# Patient Record
Sex: MALE | Race: WHITE | NOT HISPANIC OR LATINO | Employment: FULL TIME | URBAN - METROPOLITAN AREA
[De-identification: names, ages, dates, MRNs, and addresses within clinical notes are randomized per-mention and may not be internally consistent; named-entity substitution may affect disease eponyms.]

---

## 2017-02-01 ENCOUNTER — COMMUNICATION - HEALTHEAST (OUTPATIENT)
Dept: FAMILY MEDICINE | Facility: CLINIC | Age: 57
End: 2017-02-01

## 2017-02-01 DIAGNOSIS — L30.9 DERMATITIS: ICD-10-CM

## 2017-05-15 ENCOUNTER — COMMUNICATION - HEALTHEAST (OUTPATIENT)
Dept: FAMILY MEDICINE | Facility: CLINIC | Age: 57
End: 2017-05-15

## 2017-05-17 ENCOUNTER — COMMUNICATION - HEALTHEAST (OUTPATIENT)
Dept: FAMILY MEDICINE | Facility: CLINIC | Age: 57
End: 2017-05-17

## 2017-06-16 ENCOUNTER — RECORDS - HEALTHEAST (OUTPATIENT)
Dept: ADMINISTRATIVE | Facility: OTHER | Age: 57
End: 2017-06-16

## 2017-06-23 ENCOUNTER — RECORDS - HEALTHEAST (OUTPATIENT)
Dept: ADMINISTRATIVE | Facility: OTHER | Age: 57
End: 2017-06-23

## 2017-07-05 ENCOUNTER — RECORDS - HEALTHEAST (OUTPATIENT)
Dept: ADMINISTRATIVE | Facility: OTHER | Age: 57
End: 2017-07-05

## 2018-08-15 ENCOUNTER — RECORDS - HEALTHEAST (OUTPATIENT)
Dept: ADMINISTRATIVE | Facility: OTHER | Age: 58
End: 2018-08-15

## 2018-08-22 ENCOUNTER — RECORDS - HEALTHEAST (OUTPATIENT)
Dept: ADMINISTRATIVE | Facility: OTHER | Age: 58
End: 2018-08-22

## 2018-10-01 ENCOUNTER — AMBULATORY - HEALTHEAST (OUTPATIENT)
Dept: FAMILY MEDICINE | Facility: CLINIC | Age: 58
End: 2018-10-01

## 2018-10-01 ENCOUNTER — OFFICE VISIT - HEALTHEAST (OUTPATIENT)
Dept: FAMILY MEDICINE | Facility: CLINIC | Age: 58
End: 2018-10-01

## 2018-10-01 DIAGNOSIS — F41.8 DEPRESSION WITH ANXIETY: ICD-10-CM

## 2018-10-01 DIAGNOSIS — E78.5 HYPERLIPIDEMIA LDL GOAL <130: ICD-10-CM

## 2018-10-01 DIAGNOSIS — F41.1 GAD (GENERALIZED ANXIETY DISORDER): ICD-10-CM

## 2018-10-01 DIAGNOSIS — N40.1 BENIGN PROSTATIC HYPERPLASIA WITH LOWER URINARY TRACT SYMPTOMS: ICD-10-CM

## 2018-10-01 DIAGNOSIS — Z00.00 ROUTINE GENERAL MEDICAL EXAMINATION AT A HEALTH CARE FACILITY: ICD-10-CM

## 2018-10-01 DIAGNOSIS — L30.9 DERMATITIS: ICD-10-CM

## 2018-10-01 LAB
ALBUMIN SERPL-MCNC: 4 G/DL (ref 3.5–5)
ALP SERPL-CCNC: 98 U/L (ref 45–120)
ALT SERPL W P-5'-P-CCNC: 36 U/L (ref 0–45)
ANION GAP SERPL CALCULATED.3IONS-SCNC: 10 MMOL/L (ref 5–18)
AST SERPL W P-5'-P-CCNC: 38 U/L (ref 0–40)
BILIRUB SERPL-MCNC: 0.9 MG/DL (ref 0–1)
BUN SERPL-MCNC: 18 MG/DL (ref 8–22)
CALCIUM SERPL-MCNC: 9.9 MG/DL (ref 8.5–10.5)
CHLORIDE BLD-SCNC: 103 MMOL/L (ref 98–107)
CHOLEST SERPL-MCNC: 193 MG/DL
CO2 SERPL-SCNC: 29 MMOL/L (ref 22–31)
CREAT SERPL-MCNC: 1.03 MG/DL (ref 0.7–1.3)
ERYTHROCYTE [DISTWIDTH] IN BLOOD BY AUTOMATED COUNT: 12.7 % (ref 11–14.5)
FASTING STATUS PATIENT QL REPORTED: YES
GFR SERPL CREATININE-BSD FRML MDRD: >60 ML/MIN/1.73M2
GLUCOSE BLD-MCNC: 75 MG/DL (ref 70–125)
HCT VFR BLD AUTO: 42 % (ref 40–54)
HDLC SERPL-MCNC: 91 MG/DL
HGB BLD-MCNC: 14.6 G/DL (ref 14–18)
LDLC SERPL CALC-MCNC: 95 MG/DL
MCH RBC QN AUTO: 30.4 PG (ref 27–34)
MCHC RBC AUTO-ENTMCNC: 34.8 G/DL (ref 32–36)
MCV RBC AUTO: 87 FL (ref 80–100)
PLATELET # BLD AUTO: 185 THOU/UL (ref 140–440)
PMV BLD AUTO: 7.8 FL (ref 7–10)
POTASSIUM BLD-SCNC: 4 MMOL/L (ref 3.5–5)
PROT SERPL-MCNC: 6.6 G/DL (ref 6–8)
RBC # BLD AUTO: 4.8 MILL/UL (ref 4.4–6.2)
SODIUM SERPL-SCNC: 142 MMOL/L (ref 136–145)
TRIGL SERPL-MCNC: 37 MG/DL
WBC: 8.6 THOU/UL (ref 4–11)

## 2018-10-01 ASSESSMENT — MIFFLIN-ST. JEOR: SCORE: 1512.13

## 2019-09-16 ENCOUNTER — OFFICE VISIT - HEALTHEAST (OUTPATIENT)
Dept: FAMILY MEDICINE | Facility: CLINIC | Age: 59
End: 2019-09-16

## 2019-09-16 DIAGNOSIS — J02.9 SORE THROAT: ICD-10-CM

## 2019-09-16 DIAGNOSIS — L30.9 DERMATITIS: ICD-10-CM

## 2019-09-16 DIAGNOSIS — S01.81XD LACERATION OF BROW WITHOUT COMPLICATION, SUBSEQUENT ENCOUNTER: ICD-10-CM

## 2019-09-16 DIAGNOSIS — Z00.00 ROUTINE GENERAL MEDICAL EXAMINATION AT A HEALTH CARE FACILITY: ICD-10-CM

## 2019-09-16 DIAGNOSIS — N40.1 BENIGN PROSTATIC HYPERPLASIA WITH LOWER URINARY TRACT SYMPTOMS, SYMPTOM DETAILS UNSPECIFIED: ICD-10-CM

## 2019-09-16 LAB
CHOLEST SERPL-MCNC: 160 MG/DL
DEPRECATED S PYO AG THROAT QL EIA: NORMAL
FASTING STATUS PATIENT QL REPORTED: YES
FASTING STATUS PATIENT QL REPORTED: YES
GLUCOSE BLD-MCNC: 80 MG/DL (ref 70–125)
HDLC SERPL-MCNC: 77 MG/DL
LDLC SERPL CALC-MCNC: 73 MG/DL
PSA SERPL-MCNC: 1.1 NG/ML (ref 0–3.5)
TRIGL SERPL-MCNC: 50 MG/DL

## 2019-09-16 ASSESSMENT — MIFFLIN-ST. JEOR: SCORE: 1499.96

## 2019-09-16 ASSESSMENT — PATIENT HEALTH QUESTIONNAIRE - PHQ9: SUM OF ALL RESPONSES TO PHQ QUESTIONS 1-9: 11

## 2019-09-17 LAB — GROUP A STREP BY PCR: NORMAL

## 2019-11-11 LAB — PHQ9 SCORE: 12

## 2019-11-27 ENCOUNTER — OFFICE VISIT - HEALTHEAST (OUTPATIENT)
Dept: FAMILY MEDICINE | Facility: CLINIC | Age: 59
End: 2019-11-27

## 2019-11-27 DIAGNOSIS — J40 BRONCHITIS: ICD-10-CM

## 2020-01-22 ENCOUNTER — COMMUNICATION - HEALTHEAST (OUTPATIENT)
Dept: FAMILY MEDICINE | Facility: CLINIC | Age: 60
End: 2020-01-22

## 2020-01-22 DIAGNOSIS — L30.9 DERMATITIS: ICD-10-CM

## 2020-01-28 ENCOUNTER — OFFICE VISIT - HEALTHEAST (OUTPATIENT)
Dept: FAMILY MEDICINE | Facility: CLINIC | Age: 60
End: 2020-01-28

## 2020-01-28 DIAGNOSIS — J18.9 PNEUMONIA DUE TO INFECTIOUS ORGANISM, UNSPECIFIED LATERALITY, UNSPECIFIED PART OF LUNG: ICD-10-CM

## 2020-01-28 ASSESSMENT — MIFFLIN-ST. JEOR: SCORE: 1510.94

## 2020-02-05 ENCOUNTER — HOSPITAL ENCOUNTER (OUTPATIENT)
Dept: CT IMAGING | Facility: CLINIC | Age: 60
Discharge: HOME | End: 2020-02-05

## 2020-02-05 DIAGNOSIS — J40 BRONCHITIS: ICD-10-CM

## 2020-02-17 ENCOUNTER — RECORDS - HEALTHEAST (OUTPATIENT)
Dept: ADMINISTRATIVE | Facility: OTHER | Age: 60
End: 2020-02-17

## 2020-02-17 LAB — PHQ9 SCORE: 11

## 2020-05-14 ENCOUNTER — RECORDS - HEALTHEAST (OUTPATIENT)
Dept: ADMINISTRATIVE | Facility: OTHER | Age: 60
End: 2020-05-14

## 2020-08-18 ENCOUNTER — RECORDS - HEALTHEAST (OUTPATIENT)
Dept: ADMINISTRATIVE | Facility: OTHER | Age: 60
End: 2020-08-18

## 2020-09-13 ENCOUNTER — COMMUNICATION - HEALTHEAST (OUTPATIENT)
Dept: FAMILY MEDICINE | Facility: CLINIC | Age: 60
End: 2020-09-13

## 2020-09-13 DIAGNOSIS — N40.1 BENIGN PROSTATIC HYPERPLASIA WITH LOWER URINARY TRACT SYMPTOMS, SYMPTOM DETAILS UNSPECIFIED: ICD-10-CM

## 2020-10-23 ENCOUNTER — OFFICE VISIT - HEALTHEAST (OUTPATIENT)
Dept: FAMILY MEDICINE | Facility: CLINIC | Age: 60
End: 2020-10-23

## 2020-10-23 DIAGNOSIS — N40.1 BENIGN PROSTATIC HYPERPLASIA WITH LOWER URINARY TRACT SYMPTOMS, SYMPTOM DETAILS UNSPECIFIED: ICD-10-CM

## 2020-10-23 DIAGNOSIS — Z00.00 ROUTINE GENERAL MEDICAL EXAMINATION AT A HEALTH CARE FACILITY: ICD-10-CM

## 2020-10-23 DIAGNOSIS — F41.8 DEPRESSION WITH ANXIETY: ICD-10-CM

## 2020-10-23 LAB
ALBUMIN SERPL-MCNC: 4.2 G/DL (ref 3.5–5)
ALP SERPL-CCNC: 102 U/L (ref 45–120)
ALT SERPL W P-5'-P-CCNC: 26 U/L (ref 0–45)
ANION GAP SERPL CALCULATED.3IONS-SCNC: 9 MMOL/L (ref 5–18)
AST SERPL W P-5'-P-CCNC: 30 U/L (ref 0–40)
BILIRUB SERPL-MCNC: 0.6 MG/DL (ref 0–1)
BUN SERPL-MCNC: 16 MG/DL (ref 8–22)
CALCIUM SERPL-MCNC: 10.6 MG/DL (ref 8.5–10.5)
CHLORIDE BLD-SCNC: 102 MMOL/L (ref 98–107)
CHOLEST SERPL-MCNC: 199 MG/DL
CO2 SERPL-SCNC: 30 MMOL/L (ref 22–31)
CREAT SERPL-MCNC: 1.15 MG/DL (ref 0.7–1.3)
ERYTHROCYTE [DISTWIDTH] IN BLOOD BY AUTOMATED COUNT: 12.6 % (ref 11–14.5)
FASTING STATUS PATIENT QL REPORTED: YES
GFR SERPL CREATININE-BSD FRML MDRD: >60 ML/MIN/1.73M2
GLUCOSE BLD-MCNC: 91 MG/DL (ref 70–125)
HCT VFR BLD AUTO: 45 % (ref 40–54)
HDLC SERPL-MCNC: 86 MG/DL
HGB BLD-MCNC: 15 G/DL (ref 14–18)
LDLC SERPL CALC-MCNC: 105 MG/DL
MCH RBC QN AUTO: 29.8 PG (ref 27–34)
MCHC RBC AUTO-ENTMCNC: 33.3 G/DL (ref 32–36)
MCV RBC AUTO: 89 FL (ref 80–100)
PLATELET # BLD AUTO: 227 THOU/UL (ref 140–440)
PMV BLD AUTO: 8.7 FL (ref 7–10)
POTASSIUM BLD-SCNC: 5.2 MMOL/L (ref 3.5–5)
PROT SERPL-MCNC: 6.7 G/DL (ref 6–8)
PSA SERPL-MCNC: 1 NG/ML (ref 0–3.5)
RBC # BLD AUTO: 5.03 MILL/UL (ref 4.4–6.2)
SODIUM SERPL-SCNC: 141 MMOL/L (ref 136–145)
TRIGL SERPL-MCNC: 40 MG/DL
WBC: 6.1 THOU/UL (ref 4–11)

## 2020-10-23 ASSESSMENT — MIFFLIN-ST. JEOR: SCORE: 1483.5

## 2020-11-16 ENCOUNTER — RECORDS - HEALTHEAST (OUTPATIENT)
Dept: ADMINISTRATIVE | Facility: OTHER | Age: 60
End: 2020-11-16

## 2021-02-15 ENCOUNTER — RECORDS - HEALTHEAST (OUTPATIENT)
Dept: ADMINISTRATIVE | Facility: OTHER | Age: 61
End: 2021-02-15

## 2021-02-22 ENCOUNTER — COMMUNICATION - HEALTHEAST (OUTPATIENT)
Dept: FAMILY MEDICINE | Facility: CLINIC | Age: 61
End: 2021-02-22

## 2021-02-22 DIAGNOSIS — L30.9 DERMATITIS: ICD-10-CM

## 2021-03-14 ENCOUNTER — COMMUNICATION - HEALTHEAST (OUTPATIENT)
Dept: FAMILY MEDICINE | Facility: CLINIC | Age: 61
End: 2021-03-14

## 2021-03-14 DIAGNOSIS — N40.1 BENIGN PROSTATIC HYPERPLASIA WITH LOWER URINARY TRACT SYMPTOMS, SYMPTOM DETAILS UNSPECIFIED: ICD-10-CM

## 2021-03-26 ENCOUNTER — OFFICE VISIT - HEALTHEAST (OUTPATIENT)
Dept: FAMILY MEDICINE | Facility: CLINIC | Age: 61
End: 2021-03-26

## 2021-03-26 ENCOUNTER — RECORDS - HEALTHEAST (OUTPATIENT)
Dept: GENERAL RADIOLOGY | Facility: CLINIC | Age: 61
End: 2021-03-26

## 2021-03-26 DIAGNOSIS — M25.552 PAIN IN LEFT HIP: ICD-10-CM

## 2021-03-26 DIAGNOSIS — E87.5 HYPERKALEMIA: ICD-10-CM

## 2021-03-26 DIAGNOSIS — M25.552 HIP PAIN, LEFT: ICD-10-CM

## 2021-03-26 LAB
ANION GAP SERPL CALCULATED.3IONS-SCNC: 9 MMOL/L (ref 5–18)
BUN SERPL-MCNC: 16 MG/DL (ref 8–22)
CALCIUM SERPL-MCNC: 9.4 MG/DL (ref 8.5–10.5)
CHLORIDE BLD-SCNC: 101 MMOL/L (ref 98–107)
CO2 SERPL-SCNC: 28 MMOL/L (ref 22–31)
CREAT SERPL-MCNC: 1.04 MG/DL (ref 0.7–1.3)
GFR SERPL CREATININE-BSD FRML MDRD: >60 ML/MIN/1.73M2
GLUCOSE BLD-MCNC: 87 MG/DL (ref 70–125)
POTASSIUM BLD-SCNC: 4.7 MMOL/L (ref 3.5–5)
SODIUM SERPL-SCNC: 138 MMOL/L (ref 136–145)

## 2021-03-30 ENCOUNTER — RECORDS - HEALTHEAST (OUTPATIENT)
Dept: ADMINISTRATIVE | Facility: OTHER | Age: 61
End: 2021-03-30

## 2021-04-14 ENCOUNTER — RECORDS - HEALTHEAST (OUTPATIENT)
Dept: ADMINISTRATIVE | Facility: OTHER | Age: 61
End: 2021-04-14

## 2021-05-11 ENCOUNTER — RECORDS - HEALTHEAST (OUTPATIENT)
Dept: ADMINISTRATIVE | Facility: OTHER | Age: 61
End: 2021-05-11

## 2021-05-26 ASSESSMENT — PATIENT HEALTH QUESTIONNAIRE - PHQ9: SUM OF ALL RESPONSES TO PHQ QUESTIONS 1-9: 11

## 2021-05-30 ENCOUNTER — RECORDS - HEALTHEAST (OUTPATIENT)
Dept: ADMINISTRATIVE | Facility: CLINIC | Age: 61
End: 2021-05-30

## 2021-06-01 ENCOUNTER — RECORDS - HEALTHEAST (OUTPATIENT)
Dept: ADMINISTRATIVE | Facility: OTHER | Age: 61
End: 2021-06-01

## 2021-06-01 ENCOUNTER — RECORDS - HEALTHEAST (OUTPATIENT)
Dept: ADMINISTRATIVE | Facility: CLINIC | Age: 61
End: 2021-06-01

## 2021-06-01 VITALS — BODY MASS INDEX: 21.62 KG/M2 | HEIGHT: 70 IN | WEIGHT: 151 LBS
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
## 2021-06-03 VITALS
SYSTOLIC BLOOD PRESSURE: 130 MMHG | BODY MASS INDEX: 21.43 KG/M2 | HEART RATE: 83 BPM | WEIGHT: 151 LBS | OXYGEN SATURATION: 99 % | TEMPERATURE: 98.4 F | DIASTOLIC BLOOD PRESSURE: 80 MMHG

## 2021-06-03 VITALS
SYSTOLIC BLOOD PRESSURE: 128 MMHG | OXYGEN SATURATION: 98 % | DIASTOLIC BLOOD PRESSURE: 72 MMHG | HEART RATE: 61 BPM | WEIGHT: 148.3 LBS | HEIGHT: 70 IN | BODY MASS INDEX: 21.23 KG/M2

## 2021-06-03 NOTE — PROGRESS NOTES
Assessment/Plan:    1. Bronchitis  Chest x-ray obtained today.  Radiology confirmed a left mid chest faint to 10 mm nodular opacity.  Recommend follow-up with chest CT.  Placed today.  In the meantime, will treat with 10-day course of doxycycline 100 mg twice daily.  Will await results of chest CT and determine most appropriate plan going forward.  We discussed concerning symptoms that would warrant immediate evaluation.  - XR Chest 2 Views  - CT Chest With Contrast; Future  - doxycycline (MONODOX) 100 MG capsule; Take 1 capsule (100 mg total) by mouth 2 (two) times a day for 10 days.  Dispense: 20 capsule; Refill: 0      Subjective:    Vinh Cuellar is a 59 year old seen today for evaluation of persistent cough.  Patient noticed cough a little over 2 weeks ago.  Started out as a typical chest cold with cough, chest congestion, had fevers, chills and body aches as well.  Had occasional headaches.  Symptoms seem to resolve for the most part except for the cough.  He feels that cough is persisting and changing.  It has caused a hoarseness in his voice.  He has a hard time getting a productive cough and it feels weaker now.  He feels that the cough is keeping him up at night.  He denies any continued fevers, chills or body aches.  No nasal drainage, ear pain or sinus tenderness at this point.  The cough is productive at times.  He does not have any notable shortness of breath or wheezing.  No pain in his chest at this point.  He denies any ill contacts.  No recent travel outside the country.  Has not been taking any medications for his cough.  She denies any smoking or vaping history. Review of systems is as stated in HPI, and the remainder of the 10 system review is otherwise unremarkable.    Past Medical History, Family History, and Social History reviewed.    No past surgical history on file.     No family history on file.     No past medical history on file.     Social History     Tobacco Use     Smoking status:  Never Smoker     Smokeless tobacco: Never Used   Substance Use Topics     Alcohol use: No     Drug use: No        Current Outpatient Medications   Medication Sig Dispense Refill     buPROPion (WELLBUTRIN SR) 150 MG 12 hr tablet Take 150 mg by mouth 2 (two) times a day.       buPROPion (WELLBUTRIN XL) 300 MG 24 hr tablet   2     fluvoxaMINE (LUVOX) 100 MG tablet   2     lamoTRIgine (LAMICTAL) 100 MG tablet Take 100 mg by mouth every evening.        lamoTRIgine (LAMICTAL) 25 MG tablet Take 50 mg by mouth every morning.        multivitamin with minerals (MEN'S ONE DAILY) tablet Take 1 tablet by mouth daily.       tamsulosin (FLOMAX) 0.4 mg cap Take 1 capsule (0.4 mg total) by mouth daily after supper. 90 capsule 3     triamcinolone (KENALOG) 0.1 % cream Apply topically twice a day (generic for kenalog) 15 g 3     doxycycline (MONODOX) 100 MG capsule Take 1 capsule (100 mg total) by mouth 2 (two) times a day for 10 days. 20 capsule 0     No current facility-administered medications for this visit.           Objective:    Vitals:    11/27/19 1522   BP: 130/80   Patient Site: Right Arm   Patient Position: Sitting   Cuff Size: Adult Regular   Pulse: 83   Temp: 98.4  F (36.9  C)   TempSrc: Oral   SpO2: 99%   Weight: 151 lb (68.5 kg)      Body mass index is 21.43 kg/m .      General Appearance:  Alert, cooperative, no distress, appears stated age   HEENT:   Cough.  Moist mucous membranes, posterior oropharynx clear.  No nasal drainage or sinus tenderness.  Tympanic membrane's and ear canals appear normal bilaterally.   Neck: Supple, symmetrical, no adenopathy.   Lungs:    Chest wall:   Clear to auscultation bilaterally, respirations unlabored.  No expiratory wheeze or inspiratory crackles noted.  No chest wall pain   Heart:  Regular rate and rhythm, S1, S2 normal, no murmur, rub or gallop   Extremities: Extremities normal.  No cyanosis or edema   Skin: Warm, dry.  Skin color, texture, turgor normal, no rashes or lesions        Chest xray:  Hyperinflation consistent with obstructive pulmonary disease. Stable right basilar scarring. Heart size appears normal. The peripheral pulmonary vessels are slightly prominent. This could represent early interstitial edema. No effusion.     Left mid chest has a faint 10 mm nodular opacity on the 2 AP views. This could represent an anterior rib end, or lung nodule. It was not visible previously.      CT chest recommended.     NOTE: ABNORMAL REPORT    This note has been dictated using voice recognition software. Any grammatical or context distortions are unintentional and inherent to the use of this software.

## 2021-06-04 VITALS
WEIGHT: 151 LBS | HEART RATE: 58 BPM | DIASTOLIC BLOOD PRESSURE: 72 MMHG | BODY MASS INDEX: 21.62 KG/M2 | SYSTOLIC BLOOD PRESSURE: 138 MMHG | HEIGHT: 70 IN | OXYGEN SATURATION: 99 % | TEMPERATURE: 98.3 F

## 2021-06-04 VITALS
OXYGEN SATURATION: 99 % | WEIGHT: 146 LBS | SYSTOLIC BLOOD PRESSURE: 124 MMHG | DIASTOLIC BLOOD PRESSURE: 72 MMHG | BODY MASS INDEX: 20.9 KG/M2 | HEIGHT: 70 IN | HEART RATE: 71 BPM

## 2021-06-05 VITALS
BODY MASS INDEX: 21.35 KG/M2 | HEART RATE: 68 BPM | DIASTOLIC BLOOD PRESSURE: 64 MMHG | WEIGHT: 148.8 LBS | OXYGEN SATURATION: 100 % | SYSTOLIC BLOOD PRESSURE: 120 MMHG

## 2021-06-05 NOTE — PROGRESS NOTES
" Patient ID: Vinh Cuellar is a 59 y.o. male.  /72   Pulse (!) 58   Temp 98.3  F (36.8  C)   Ht 5' 10.3\" (1.786 m)   Wt 151 lb (68.5 kg)   SpO2 99%   BMI 21.48 kg/m      Assessment/Plan:                   Diagnoses and all orders for this visit:    Pneumonia due to infectious organism, unspecified laterality, unspecified part of lung  -     amoxicillin (AMOXIL) 875 MG tablet; Take 1 tablet (875 mg total) by mouth 2 (two) times a day for 10 days.  Dispense: 20 tablet; Refill: 0  -     azithromycin (ZITHROMAX Z-KEMI) 250 MG tablet; Take 2 tablets (500 mg) on  Day 1,  followed by 1 tablet (250 mg) once daily on Days 2 through 5.  Dispense: 6 tablet; Refill: 0        DISCUSSION  Clinically symptoms are consistent with pneumonia.  Since he is scheduled for a CT scan next week we will proceed with this rather than the imaging today because clinically speaking his symptoms are most consistent with a pneumonia process.  We will start him on typical community-acquired pneumonia pathway with amoxicillin azithromycin.  Recommend continued symptomatic treatment as desired.  Follow-up if there is worsening.  Subjective:     HPI    Vinh Cuellar is a 59 y.o. male he is here today concerned regarding cough.  Patient reports that he has had a week of symptoms that have included productive mucus that is greenish in color frequent cough with occasional coughing spells.  He feels tired and rundown.  Denies fevers or chills or significant shortness of breath.  No focal chest pain.  Patient noted to have ongoing concerns with the more mild respiratory infection symptoms stemming back to November.  He was seen at that time he had a chest x-ray that showed what was possibly a 10 mm nodule.  It was recommended he have a CT scan of the chest which was scheduled for next week.  The finding actually occurred back in November.  I discussed the importance of follow-through on this to make sure this is not a more significant " "process.    Review of Systems   Complete review of systems is obtained.  Other than the specific considerations noted above complete review of systems is negative.        Objective:   Medications:  Current Outpatient Medications   Medication Sig Note     amoxicillin (AMOXIL) 875 MG tablet Take 1 tablet (875 mg total) by mouth 2 (two) times a day for 10 days.      azithromycin (ZITHROMAX Z-KEMI) 250 MG tablet Take 2 tablets (500 mg) on  Day 1,  followed by 1 tablet (250 mg) once daily on Days 2 through 5.      buPROPion (WELLBUTRIN SR) 150 MG 12 hr tablet Take 150 mg by mouth 2 (two) times a day.      buPROPion (WELLBUTRIN XL) 300 MG 24 hr tablet  10/1/2018: Received from: External Pharmacy     fluvoxaMINE (LUVOX) 100 MG tablet  10/1/2018: Received from: External Pharmacy     lamoTRIgine (LAMICTAL) 100 MG tablet Take 100 mg by mouth every evening.       lamoTRIgine (LAMICTAL) 25 MG tablet Take 50 mg by mouth every morning.       multivitamin with minerals (MEN'S ONE DAILY) tablet Take 1 tablet by mouth daily.      tamsulosin (FLOMAX) 0.4 mg cap Take 1 capsule (0.4 mg total) by mouth daily after supper.      triamcinolone (KENALOG) 0.1 % cream Apply topically twice a day (generic for kenalog)      Allergies:  Allergies   Allergen Reactions     Detrol [Tolterodine] Hives     Tobacco:   reports that he has never smoked. He has never used smokeless tobacco.     Physical Exam      /72   Pulse (!) 58   Temp 98.3  F (36.8  C)   Ht 5' 10.3\" (1.786 m)   Wt 151 lb (68.5 kg)   SpO2 99%   BMI 21.48 kg/m      General Appearance:    Alert, cooperative, no distress   Eyes:   No scleral icterus or conjunctival irritation       Ears:    Normal TM's and external ear canals, both ears   Throat:   Lips, mucosa, and tongue normal; teeth and gums normal   Neck:   Supple, symmetrical, trachea midline, no adenopathy;        thyroid:  No enlargement/tenderness/nodules   Lungs:     Clear to auscultation bilaterally, respirations " unlabored, no wheezes or crackles   Heart:    Regular rate and rhythm,  No murmur   Extremities:  No edema, no joint swelling or redness, no evidence of any injuries   Skin:  No concerning skin findings, no suspicious moles, no rashes   Neurologic:  On gross examination there is no motor or sensory deficit.  Patient walks with a normal gait

## 2021-06-05 NOTE — TELEPHONE ENCOUNTER
RN cannot approve Refill Request    RN can NOT refill this medication med is not covered by policy/route to provider     . Last office visit: 5/6/2016 Bruce Pierda MD Last Physical: 9/16/2019 Last MTM visit: Visit date not found Last visit same specialty: 11/27/2019 Makayla Hester, [facility].  Next visit within 3 mo: Visit date not found  Next physical within 3 mo: Visit date not found      Gabrielle Reno, [facility] Triage/Med Refill 1/22/2020    Requested Prescriptions   Pending Prescriptions Disp Refills     triamcinolone (KENALOG) 0.1 % cream 15 g 3     Sig: Apply topically twice a day (generic for kenalog)       There is no refill protocol information for this order

## 2021-06-11 NOTE — TELEPHONE ENCOUNTER
Refill Approved    Rx renewed per Medication Renewal Policy. Medication was last renewed on 9/16/19.    Gabrielle Reno, [facility] Triage/Med Refill 9/14/2020     Requested Prescriptions   Pending Prescriptions Disp Refills     tamsulosin (FLOMAX) 0.4 mg cap [Pharmacy Med Name: TAMSULOSIN HCL 0.4MG CAPS] 90 capsule 3     Sig: TAKE ONE CAPSULE BY MOUTH EVERY DAY AFTER SUPPER       Alfuzosin/Tamsulosin/Silodosin Refill Protocol  Passed - 9/13/2020  8:19 PM        Passed - PCP or prescribing provider visit in past 12 months       Last office visit with prescriber/PCP: 1/28/2020 Bruce Piedra MD OR same dept: 1/28/2020 Bruce Piedra MD OR same specialty: 1/28/2020 Bruce Piedra MD  Last physical: 9/16/2019 Last MTM visit: Visit date not found   Next visit within 3 mo: Visit date not found  Next physical within 3 mo: Visit date not found  Prescriber OR PCP: Bruce Piedra MD  Last diagnosis associated with med order: 1. Benign prostatic hyperplasia with lower urinary tract symptoms, symptom details unspecified  - tamsulosin (FLOMAX) 0.4 mg cap [Pharmacy Med Name: TAMSULOSIN HCL 0.4MG CAPS]; TAKE ONE CAPSULE BY MOUTH EVERY DAY AFTER SUPPER  Dispense: 90 capsule; Refill: 3    If protocol passes may refill for 12 months if within 3 months of last provider visit (or a total of 15 months).
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
## 2021-06-15 NOTE — TELEPHONE ENCOUNTER
Refill Approved    Rx renewed per Medication Renewal Policy. Medication was last renewed on 9/14/20.    Neil Franco, [facility] Triage/Med Refill 3/15/2021     Requested Prescriptions   Pending Prescriptions Disp Refills     tamsulosin (FLOMAX) 0.4 mg cap [Pharmacy Med Name: TAMSULOSIN HCL 0.4MG CAPS] 90 capsule 1     Sig: TAKE ONE CAPSULE BY MOUTH EVERY DAY AFTER SUPPER       Alfuzosin/Tamsulosin/Silodosin Refill Protocol  Passed - 3/14/2021  8:10 PM        Passed - PCP or prescribing provider visit in past 12 months       Last office visit with prescriber/PCP: 1/28/2020 Bruce Piedra MD OR same dept: Visit date not found OR same specialty: 1/28/2020 Bruce Piedra MD  Last physical: 10/23/2020 Last MTM visit: Visit date not found   Next visit within 3 mo: Visit date not found  Next physical within 3 mo: Visit date not found  Prescriber OR PCP: Bruce Piedra MD  Last diagnosis associated with med order: 1. Benign prostatic hyperplasia with lower urinary tract symptoms, symptom details unspecified  - tamsulosin (FLOMAX) 0.4 mg cap [Pharmacy Med Name: TAMSULOSIN HCL 0.4MG CAPS]; TAKE ONE CAPSULE BY MOUTH EVERY DAY AFTER SUPPER  Dispense: 90 capsule; Refill: 1    If protocol passes may refill for 12 months if within 3 months of last provider visit (or a total of 15 months).

## 2021-06-16 NOTE — PROGRESS NOTES
Patient ID: Vinh Cuellar is a 60 y.o. male.  /64   Pulse 68   Wt 148 lb 12.8 oz (67.5 kg)   SpO2 100%   BMI 21.35 kg/m      Assessment/Plan:                   Diagnoses and all orders for this visit:    Hyperkalemia  -     Basic Metabolic Panel    Hip pain, left  -     XR Hip Left 2 or More VWS; Future; Expected date: 03/26/2021  -     Ambulatory referral to Orthopedics    Other orders  -     Cancel: Comprehensive Metabolic Panel           DISCUSSION  Obtain x-ray of the hip.  Highest likelihood based on clinical scenario of osteoarthritis.  Obtain basic metabolic profile.    Hip x-ray shows progressive degenerative changes within the hip joint which I feel is likely the cause of his pain symptoms.  Discussed continued use of over-the-counter analgesic medication to manage pain along with carefully choosing activities.  We will get him set up to see orthopedic specialty provider for further evaluation and treatment planning.  Subjective:     HPI    Vinh Cuellar is a 60 y.o. male he is here today to discuss ongoing left hip pain that is now worse than when he was in for his physical in January.  He also needs repeat laboratory test.  On routine laboratory testing he had mild hyperkalemia and hypocalcemia.    He is active person has been walking and biking, normally he is a runner.  Due to increasing left hip pain has not been able to run.  Reports more aching pain that is more common and more severe.  Pain radiates sometimes all the way down to the foot.  Originates in the left hip/groin area.  No history of a specific injury.  More conservative treatment has not helped to alleviate symptoms overall.  Acetaminophen and ibuprofen along with topical pain relievers including Voltaren gel and Aspercreme have been tried with some success in alleviating more severe discomfort.  Denies numbness or tingling.  No other areas of significant joint pain.    Electrolyte disturbances likely transient.  No  medications that would directly affect this.  Will be checked before taking any further action.    Review of Systems  Complete review of systems is obtained.  Other than the specific considerations noted above complete review of systems is negative.          Objective:   Medications:  Current Outpatient Medications   Medication Sig Note     buPROPion (WELLBUTRIN XL) 300 MG 24 hr tablet  10/1/2018: Received from: External Pharmacy     cholecalciferol, vitamin D3, (VITAMIN D3) 25 mcg (1,000 unit) capsule Take 1,000 Units by mouth daily.      fluvoxaMINE (LUVOX) 100 MG tablet Take 150 mg by mouth 2 (two) times a day.  10/1/2018: Received from: External Pharmacy     lamoTRIgine (LAMICTAL) 100 MG tablet Take 100 mg by mouth every evening.       lamoTRIgine (LAMICTAL) 25 MG tablet Take 50 mg by mouth every morning.       multivitamin with minerals (MEN'S ONE DAILY) tablet Take 1 tablet by mouth daily.      tamsulosin (FLOMAX) 0.4 mg cap TAKE ONE CAPSULE BY MOUTH EVERY DAY AFTER SUPPER      triamcinolone (KENALOG) 0.1 % cream Apply topically twice a day (generic for kenalog)        Allergies:  Allergies   Allergen Reactions     Detrol [Tolterodine] Hives       Tobacco:   reports that he has never smoked. He has never used smokeless tobacco.     Physical Exam          /64   Pulse 68   Wt 148 lb 12.8 oz (67.5 kg)   SpO2 100%   BMI 21.35 kg/m      General: Patient no signs of distress    Patient was hip reveals no pain on palpation.  There is no pain over the trochanter.  Extreme limitation with significant pain with both internal and external rotation.  Very limited external rotation secondary to discomfort.  Hip flexion is not significantly limited, there is good hip flexor strength.  No evidence of any strength deficit in the lower extremity.  Sensation grossly intact.

## 2021-06-20 NOTE — PROGRESS NOTES
" Patient ID: Vinh Cuellar is a 58 y.o. male.  /74  Pulse (!) 57  Ht 5' 10.38\" (1.788 m)  Wt 151 lb (68.5 kg)  SpO2 99%  BMI 21.44 kg/m2    Assessment/Plan:                   Diagnoses and all orders for this visit:    Routine general medical examination at a health care facility    Benign prostatic hyperplasia with lower urinary tract symptoms    Depression with anxiety  -     Comprehensive Metabolic Panel  -     HM2(CBC w/o Differential)    STEVEN (generalized anxiety disorder)    Dermatitis  -     triamcinolone (KENALOG) 0.1 % cream; Apply topically twice a day (generic for kenalog)  Dispense: 15 g; Refill: 3    Hyperlipidemia LDL goal <130  -     Lipid Cascade    Other orders  -     Cancel: PSA, Annual Screen (Prostatic-Specific Antigen)  -     Influenza, Recombinant, Inj, Quadrivalent, PF, 18+YRS      DISCUSSION  Continue current medications for management of mental health concerns recommend he continue to follow-up with his mental health care providers as prior.  We will obtain some additional labs including a conference metabolic panel and hemogram for medication monitoring purposes.  We will check cholesterol and blood sugar as well.  Influenza vaccine today.  Refill triamcinolone for management of chronic dermatitis.  Overall doing well recommend he follow-up with urology as they have suggested.  Colonoscopy will not be due until 2020.  Subjective:     HPI    Vinh Cuellar is a 58-year man who is here today for a physical.  He has anxiety and depression symptoms that are managed by psychiatry.  Patient reports his mental health is doing very well.  He is overactive bladder, history of prostatic hypertrophy and a prostate nodule.  He sees urology on a regular basis including just a few weeks ago for an evaluation.  Patient reports no concerns and those consultation notes are reviewed.  Patient reports no new skin concerns.  Has issues with chronic dermatitis on the face and trunk region for " which she uses low potency topical corticosteroid.  He had a suspicious skin lesion removed a couple of years ago and reports no new concerns.  He is up-to-date with colonoscopy screening.  Reviewed immunizations.  Discussed social and family history.  Discussed more specifically his father who had history of vascular disease and  this past year of chronic renal disease.    Review of Systems  Complete review of systems is obtained.  Other than the specific considerations noted above complete review of systems is negative.      Objective:   Medications:  Current Outpatient Prescriptions   Medication Sig Note     buPROPion (WELLBUTRIN XL) 300 MG 24 hr tablet  10/1/2018: Received from: External Pharmacy     fluvoxaMINE (LUVOX) 100 MG tablet  10/1/2018: Received from: External Pharmacy     lamoTRIgine (LAMICTAL) 100 MG tablet Take 100 mg by mouth every evening.       lamoTRIgine (LAMICTAL) 25 MG tablet Take 50 mg by mouth every morning.       multivitamin with minerals (MEN'S ONE DAILY) tablet Take 1 tablet by mouth daily.      tamsulosin (FLOMAX) 0.4 mg Cp24 Take 1 capsule (0.4 mg total) by mouth daily after supper.      triamcinolone (KENALOG) 0.1 % cream Apply topically twice a day (generic for kenalog)      Allergies:  Allergies   Allergen Reactions     Detrol [Tolterodine] Hives     Tobacco:   reports that he has never smoked. He has never used smokeless tobacco.    HEALTH PREVENTION    General  Dental care: Discussed the importance of regular dental care.  Eye care: Discussed importance of routine eye exams for glaucoma screening  Exercise: He runs on average 40 miles per week.  He has an excellent exercise regiment.  Diet: He has a healthy balanced diet.  Reviewed his diet today    Wt Readings from Last 3 Encounters:   10/01/18 151 lb (68.5 kg)   16 144 lb (65.3 kg)   16 148 lb 12.8 oz (67.5 kg)     Body mass index is 21.44 kg/(m^2).      Cancer screening  Testicular cancer:is discussed and exam  "performed today  Skin cancer: Discussed sun burn prevention and self monitoring.  Colon cancer: Colon cancer screening is discussed.  Colonoscopy due in 2020  Prostate cancer: Reviewed screening obtained at urologist office recently    Cholesterol:   LDL Calculated (mg/dL)   Date Value   04/29/2016 104   04/24/2015 99   01/31/2014 111      Blood Pressure:   BP Readings from Last 3 Encounters:   10/01/18 124/74   08/23/16 108/62   08/16/16 136/90     Immunization History   Administered Date(s) Administered     Influenza, inj, historic,unspecified 10/01/2015, 09/30/2016     Influenza, seasonal,quad inj 36+ mos 09/30/2016     Influenza,seasonal quad, PF, 36+MOS 09/12/2017     Influenza,seasonal, Inj IIV3 10/28/2010, 10/20/2011, 10/16/2012     Tdap 04/29/2016     There are no preventive care reminders to display for this patient.     Physical Exam    /74  Pulse (!) 57  Ht 5' 10.38\" (1.788 m)  Wt 151 lb (68.5 kg)  SpO2 99%  BMI 21.44 kg/m2    General Appearance:    Alert, cooperative, no distress, appears stated age   Eyes:   No scleral icterus or conjunctival irritation       Ears:    Normal TM's and external ear canals, both ears   Throat:   Lips, mucosa, and tongue normal; teeth and gums normal   Neck:   Supple, symmetrical, trachea midline, no adenopathy;        thyroid:  No enlargement/tenderness/nodules   Lungs:     Clear to auscultation bilaterally, respirations unlabored, no wheezes or crackles   Heart:    Regular rate and rhythm,  no murmur, rub   or gallop   Abdomen:     Soft, non-tender, bowel sounds active,     no masses, no organomegaly     Extremities:   Extremities normal, atraumatic, no cyanosis or edema   Skin:   Skin color, texture, turgor normal, no rashes or lesions   Neurologic:   Normal strength and sensation        throughout on gross examination.                                 "

## 2021-06-27 ENCOUNTER — HEALTH MAINTENANCE LETTER (OUTPATIENT)
Age: 61
End: 2021-06-27

## 2021-07-17 DIAGNOSIS — Z01.818 PREOP GENERAL PHYSICAL EXAM: ICD-10-CM

## 2021-07-17 PROCEDURE — U0005 INFEC AGEN DETEC AMPLI PROBE: HCPCS

## 2021-07-17 PROCEDURE — U0003 INFECTIOUS AGENT DETECTION BY NUCLEIC ACID (DNA OR RNA); SEVERE ACUTE RESPIRATORY SYNDROME CORONAVIRUS 2 (SARS-COV-2) (CORONAVIRUS DISEASE [COVID-19]), AMPLIFIED PROBE TECHNIQUE, MAKING USE OF HIGH THROUGHPUT TECHNOLOGIES AS DESCRIBED BY CMS-2020-01-R: HCPCS

## 2021-07-18 LAB — SARS-COV-2 RNA RESP QL NAA+PROBE: NEGATIVE

## 2021-07-19 ENCOUNTER — TRANSFERRED RECORDS (OUTPATIENT)
Dept: HEALTH INFORMATION MANAGEMENT | Facility: CLINIC | Age: 61
End: 2021-07-19
Payer: COMMERCIAL

## 2021-08-03 ENCOUNTER — TRANSFERRED RECORDS (OUTPATIENT)
Dept: HEALTH INFORMATION MANAGEMENT | Facility: CLINIC | Age: 61
End: 2021-08-03

## 2021-08-10 ENCOUNTER — TRANSFERRED RECORDS (OUTPATIENT)
Dept: HEALTH INFORMATION MANAGEMENT | Facility: CLINIC | Age: 61
End: 2021-08-10

## 2021-08-31 ENCOUNTER — TRANSFERRED RECORDS (OUTPATIENT)
Dept: HEALTH INFORMATION MANAGEMENT | Facility: CLINIC | Age: 61
End: 2021-08-31

## 2021-10-17 ENCOUNTER — HEALTH MAINTENANCE LETTER (OUTPATIENT)
Age: 61
End: 2021-10-17

## 2021-11-16 ENCOUNTER — TRANSFERRED RECORDS (OUTPATIENT)
Dept: HEALTH INFORMATION MANAGEMENT | Facility: CLINIC | Age: 61
End: 2021-11-16
Payer: COMMERCIAL

## 2021-12-06 PROBLEM — N40.1 BENIGN PROSTATIC HYPERPLASIA WITH LOWER URINARY TRACT SYMPTOMS: Status: ACTIVE | Noted: 2017-08-31

## 2021-12-06 PROBLEM — R53.83 OTHER MALAISE AND FATIGUE: Status: ACTIVE | Noted: 2021-12-06

## 2021-12-06 PROBLEM — F41.8 DEPRESSION WITH ANXIETY: Status: ACTIVE | Noted: 2017-08-31

## 2021-12-06 PROBLEM — R53.81 OTHER MALAISE AND FATIGUE: Status: ACTIVE | Noted: 2021-12-06

## 2021-12-06 PROBLEM — D48.5 NEOPLASM OF UNCERTAIN BEHAVIOR OF SKIN: Status: ACTIVE | Noted: 2021-12-06

## 2021-12-06 RX ORDER — TRIAMCINOLONE ACETONIDE 1 MG/G
CREAM TOPICAL
COMMUNITY
Start: 2021-02-22 | End: 2022-04-27

## 2021-12-06 RX ORDER — TAMSULOSIN HYDROCHLORIDE 0.4 MG/1
CAPSULE ORAL
COMMUNITY
Start: 2015-07-01 | End: 2021-12-20

## 2021-12-07 ENCOUNTER — OFFICE VISIT (OUTPATIENT)
Dept: FAMILY MEDICINE | Facility: CLINIC | Age: 61
End: 2021-12-07
Payer: COMMERCIAL

## 2021-12-07 VITALS
SYSTOLIC BLOOD PRESSURE: 124 MMHG | HEART RATE: 72 BPM | BODY MASS INDEX: 21.3 KG/M2 | HEIGHT: 70 IN | WEIGHT: 148.8 LBS | DIASTOLIC BLOOD PRESSURE: 72 MMHG | OXYGEN SATURATION: 99 %

## 2021-12-07 DIAGNOSIS — Z00.00 ROUTINE GENERAL MEDICAL EXAMINATION AT A HEALTH CARE FACILITY: Primary | ICD-10-CM

## 2021-12-07 DIAGNOSIS — Z23 HIGH PRIORITY FOR 2019-NCOV VACCINE: ICD-10-CM

## 2021-12-07 DIAGNOSIS — Z11.59 ENCOUNTER FOR HEPATITIS C SCREENING TEST FOR LOW RISK PATIENT: ICD-10-CM

## 2021-12-07 DIAGNOSIS — Z11.4 ENCOUNTER FOR SCREENING FOR HIV: ICD-10-CM

## 2021-12-07 LAB
ALBUMIN SERPL-MCNC: 4.1 G/DL (ref 3.5–5)
ALP SERPL-CCNC: 89 U/L (ref 45–120)
ALT SERPL W P-5'-P-CCNC: 27 U/L (ref 0–45)
ANION GAP SERPL CALCULATED.3IONS-SCNC: 12 MMOL/L (ref 5–18)
AST SERPL W P-5'-P-CCNC: 27 U/L (ref 0–40)
BILIRUB SERPL-MCNC: 0.7 MG/DL (ref 0–1)
BUN SERPL-MCNC: 17 MG/DL (ref 8–22)
CALCIUM SERPL-MCNC: 9.9 MG/DL (ref 8.5–10.5)
CHLORIDE BLD-SCNC: 101 MMOL/L (ref 98–107)
CHOLEST SERPL-MCNC: 208 MG/DL
CO2 SERPL-SCNC: 27 MMOL/L (ref 22–31)
CREAT SERPL-MCNC: 1 MG/DL (ref 0.7–1.3)
FASTING STATUS PATIENT QL REPORTED: YES
GFR SERPL CREATININE-BSD FRML MDRD: 81 ML/MIN/1.73M2
GLUCOSE BLD-MCNC: 78 MG/DL (ref 70–125)
HCV AB SERPL QL IA: NEGATIVE
HDLC SERPL-MCNC: 92 MG/DL
HIV 1+2 AB+HIV1 P24 AG SERPL QL IA: NEGATIVE
LDLC SERPL CALC-MCNC: 109 MG/DL
POTASSIUM BLD-SCNC: 4.7 MMOL/L (ref 3.5–5)
PROT SERPL-MCNC: 6.6 G/DL (ref 6–8)
PSA SERPL-MCNC: 0.98 UG/L (ref 0–4.5)
SODIUM SERPL-SCNC: 140 MMOL/L (ref 136–145)
TRIGL SERPL-MCNC: 36 MG/DL

## 2021-12-07 PROCEDURE — 86803 HEPATITIS C AB TEST: CPT | Performed by: FAMILY MEDICINE

## 2021-12-07 PROCEDURE — 99396 PREV VISIT EST AGE 40-64: CPT | Performed by: FAMILY MEDICINE

## 2021-12-07 PROCEDURE — 0064A COVID-19,PF,MODERNA (18+ YRS BOOSTER .25ML): CPT | Performed by: FAMILY MEDICINE

## 2021-12-07 PROCEDURE — 80061 LIPID PANEL: CPT | Performed by: FAMILY MEDICINE

## 2021-12-07 PROCEDURE — G0103 PSA SCREENING: HCPCS | Performed by: FAMILY MEDICINE

## 2021-12-07 PROCEDURE — 36415 COLL VENOUS BLD VENIPUNCTURE: CPT | Performed by: FAMILY MEDICINE

## 2021-12-07 PROCEDURE — 80053 COMPREHEN METABOLIC PANEL: CPT | Performed by: FAMILY MEDICINE

## 2021-12-07 PROCEDURE — 87389 HIV-1 AG W/HIV-1&-2 AB AG IA: CPT | Performed by: FAMILY MEDICINE

## 2021-12-07 PROCEDURE — 91306 COVID-19,PF,MODERNA (18+ YRS BOOSTER .25ML): CPT | Performed by: FAMILY MEDICINE

## 2021-12-07 RX ORDER — FLUVOXAMINE MALEATE 100 MG
TABLET ORAL
COMMUNITY
Start: 2021-11-17

## 2021-12-07 RX ORDER — KETOROLAC TROMETHAMINE 10 MG/1
TABLET, FILM COATED ORAL
COMMUNITY
Start: 2021-07-10 | End: 2021-12-07

## 2021-12-07 RX ORDER — BUPROPION HYDROCHLORIDE 300 MG/1
300 TABLET ORAL DAILY
COMMUNITY
Start: 2021-11-17

## 2021-12-07 RX ORDER — DEXAMETHASONE 0.5 MG/1
TABLET ORAL
COMMUNITY
Start: 2021-06-03 | End: 2021-12-07

## 2021-12-07 RX ORDER — HYDROCODONE BITARTRATE AND ACETAMINOPHEN 5; 325 MG/1; MG/1
TABLET ORAL
COMMUNITY
Start: 2021-11-02 | End: 2021-12-07

## 2021-12-07 RX ORDER — MUPIROCIN 20 MG/G
OINTMENT TOPICAL
COMMUNITY
Start: 2021-07-10 | End: 2021-12-07

## 2021-12-07 RX ORDER — HYDROXYZINE HYDROCHLORIDE 25 MG/1
TABLET, FILM COATED ORAL
COMMUNITY
Start: 2021-08-03 | End: 2021-12-07

## 2021-12-07 RX ORDER — CLINDAMYCIN HCL 300 MG
CAPSULE ORAL
COMMUNITY
Start: 2021-11-02 | End: 2021-12-07

## 2021-12-07 RX ORDER — OXYCODONE HYDROCHLORIDE 5 MG/1
TABLET ORAL
COMMUNITY
Start: 2021-08-23 | End: 2021-12-07

## 2021-12-07 RX ORDER — CEFADROXIL 500 MG/1
CAPSULE ORAL
COMMUNITY
Start: 2021-07-10 | End: 2021-12-07

## 2021-12-07 RX ORDER — AMOXICILLIN 500 MG/1
CAPSULE ORAL
COMMUNITY
Start: 2021-10-19

## 2021-12-07 RX ORDER — LAMOTRIGINE 25 MG/1
TABLET ORAL
COMMUNITY
Start: 2021-11-17

## 2021-12-07 RX ORDER — ONDANSETRON 4 MG/1
TABLET, ORALLY DISINTEGRATING ORAL
COMMUNITY
Start: 2021-07-10 | End: 2021-12-07

## 2021-12-07 RX ORDER — LAMOTRIGINE 100 MG/1
100 TABLET ORAL DAILY
COMMUNITY
Start: 2021-11-17

## 2021-12-07 RX ORDER — CHLORHEXIDINE GLUCONATE ORAL RINSE 1.2 MG/ML
SOLUTION DENTAL
COMMUNITY
Start: 2021-06-03 | End: 2021-12-07

## 2021-12-07 ASSESSMENT — ANXIETY QUESTIONNAIRES
3. WORRYING TOO MUCH ABOUT DIFFERENT THINGS: NOT AT ALL
2. NOT BEING ABLE TO STOP OR CONTROL WORRYING: NOT AT ALL
7. FEELING AFRAID AS IF SOMETHING AWFUL MIGHT HAPPEN: NOT AT ALL
GAD7 TOTAL SCORE: 4
GAD7 TOTAL SCORE: 4
1. FEELING NERVOUS, ANXIOUS, OR ON EDGE: SEVERAL DAYS
6. BECOMING EASILY ANNOYED OR IRRITABLE: SEVERAL DAYS
GAD7 TOTAL SCORE: 4
8. IF YOU CHECKED OFF ANY PROBLEMS, HOW DIFFICULT HAVE THESE MADE IT FOR YOU TO DO YOUR WORK, TAKE CARE OF THINGS AT HOME, OR GET ALONG WITH OTHER PEOPLE?: NOT DIFFICULT AT ALL
4. TROUBLE RELAXING: SEVERAL DAYS
7. FEELING AFRAID AS IF SOMETHING AWFUL MIGHT HAPPEN: NOT AT ALL
5. BEING SO RESTLESS THAT IT IS HARD TO SIT STILL: SEVERAL DAYS

## 2021-12-07 ASSESSMENT — MIFFLIN-ST. JEOR: SCORE: 1491.2

## 2021-12-07 ASSESSMENT — PATIENT HEALTH QUESTIONNAIRE - PHQ9
SUM OF ALL RESPONSES TO PHQ QUESTIONS 1-9: 5
SUM OF ALL RESPONSES TO PHQ QUESTIONS 1-9: 5
10. IF YOU CHECKED OFF ANY PROBLEMS, HOW DIFFICULT HAVE THESE PROBLEMS MADE IT FOR YOU TO DO YOUR WORK, TAKE CARE OF THINGS AT HOME, OR GET ALONG WITH OTHER PEOPLE: NOT DIFFICULT AT ALL

## 2021-12-07 NOTE — PROGRESS NOTES
"Vinh Cuellar  /72   Pulse 72   Ht 1.778 m (5' 10\")   Wt 67.5 kg (148 lb 12.8 oz)   SpO2 99%   BMI 21.35 kg/m       Assessment/Plan:                Vinh was seen today for physical, refill request, labs only and imm/inj.    Diagnoses and all orders for this visit:    Routine general medical examination at a health care facility  -     PSA, screen  -     Comprehensive metabolic panel (BMP + Alb, Alk Phos, ALT, AST, Total. Bili, TP)  -     Lipid panel reflex to direct LDL Fasting    Encounter for hepatitis C screening test for low risk patient  -     Hepatitis C antibody; Future    Encounter for screening for HIV  -     HIV Antigen Antibody Combo; Future    High priority for 2019-nCoV vaccine  -     COVID-19,PF,MODERNA (18+ Yrs BOOSTER .25mL)         DISCUSSION  see discussion below.  Obtain labs as above.  [place] booster today.  Subjective:     HPI:    Vinh Cuellar is a 61 year old male he has a history of mental health concerns, BPH and hip arthritis.  This past year underwent hip arthroplasty is currently doing well.  Has resumed most normal activities and is exercising most days of the week for up to an hour.  Weight is ideal.  Blood pressure is ideal.  Discussed rechecking a basic lab test today.  Discussed updating Covid vaccine with booster dose today.  Discussed obtaining a shingles vaccine at some point in the future.  He is due for a colonoscopy, this is discussed he will call to get it scheduled.  There was some confusion about what his computerized chart said versus what is the actual recommendation for screening which is to be done at this point in time.  Agrees to hepatitis C and HIV screening, discussed.    ROS:  Complete review of systems is obtained.  Other than the specific considerations noted above complete review of systems is negative.    Objective:   Medications:  Current Outpatient Medications   Medication     amoxicillin (AMOXIL) 500 MG capsule     buPROPion (WELLBUTRIN XL) " 300 MG 24 hr tablet     cholecalciferol (VITAMIN D3) 25 mcg (1000 units) capsule     fluvoxaMINE (LUVOX) 100 MG tablet     lamoTRIgine (LAMICTAL) 100 MG tablet     lamoTRIgine (LAMICTAL) 25 MG tablet     MOTRIN 800 MG OR TABS     MULTI-VITAMIN TABS   OR     tamsulosin (FLOMAX) 0.4 MG capsule     triamcinolone (KENALOG) 0.1 % external cream     No current facility-administered medications for this visit.        Allergies:     Allergies   Allergen Reactions     Tolterodine Tartrate Er Hives        Social History     Socioeconomic History     Marital status: Single     Spouse name: Not on file     Number of children: Not on file     Years of education: Not on file     Highest education level: Not on file   Occupational History     Not on file   Tobacco Use     Smoking status: Never Smoker     Smokeless tobacco: Never Used   Substance and Sexual Activity     Alcohol use: No     Drug use: No     Sexual activity: Not on file   Other Topics Concern     Not on file   Social History Narrative     Not on file     Social Determinants of Health     Financial Resource Strain: Not on file   Food Insecurity: Not on file   Transportation Needs: Not on file   Physical Activity: Not on file   Stress: Not on file   Social Connections: Not on file   Intimate Partner Violence: Not on file   Housing Stability: Not on file       Family History   Problem Relation Age of Onset     EYE* Mother         glaucoma        Most Recent Immunizations   Administered Date(s) Administered     COVID-19,PF,Moderna 06/11/2021     Flu, Unspecified 09/30/2016     Influenza (IIV3) PF 10/16/2012     Influenza Quad, Recombinant, pf(RIV4) (Flublok) 10/23/2020     Influenza Vaccine IM > 6 months Valent IIV4 (Alfuria,Fluzone) 10/10/2021     Influenza Vaccine, 6+MO IM (QUADRIVALENT W/PRESERVATIVES) 09/30/2016     Tdap (Adacel,Boostrix) 04/29/2016   Pended Date(s) Pended     COVID-19,PF,Moderna Booster 12/07/2021        Wt Readings from Last 3 Encounters:  "  12/07/21 67.5 kg (148 lb 12.8 oz)   07/09/21 67.6 kg (149 lb)   03/26/21 67.5 kg (148 lb 12.8 oz)        BP Readings from Last 6 Encounters:   12/07/21 124/72   07/09/21 124/72   03/26/21 120/64   10/23/20 124/72   01/28/20 138/72   11/27/19 130/80      PHYSICAL EXAM:    /72   Pulse 72   Ht 1.778 m (5' 10\")   Wt 67.5 kg (148 lb 12.8 oz)   SpO2 99%   BMI 21.35 kg/m           General Appearance:    Alert, cooperative, no distress   Eyes:   No scleral icterus or conjunctival irritation       Ears:    Normal TM's and external ear canals, both ears   Throat:   Lips, mucosa, and tongue normal; teeth and gums normal   Neck:   Supple, symmetrical, trachea midline, no adenopathy;        thyroid:  No enlargement/tenderness/nodules   Lungs:     Clear to auscultation bilaterally, respirations unlabored, no wheezes or crackles   Heart:    Regular rate and rhythm,  No murmur   Abdomen:    Soft, no distention, no tenderness on palpation, no masses, no organomegaly     Extremities:  No edema, no joint swelling or redness, no evidence of any injuries   Skin:  No concerning skin findings, no suspicious moles, no rashes   Neurologic:  On gross examination there is no motor or sensory deficit.  Patient walks with a normal gait     Answers for HPI/ROS submitted by the patient on 12/7/2021  If you checked off any problems, how difficult have these problems made it for you to do your work, take care of things at home, or get along with other people?: Not difficult at all  PHQ9 TOTAL SCORE: 5  STEVEN 7 TOTAL SCORE: 4  Frequency of exercise:: 6-7 days/week  Getting at least 3 servings of Calcium per day:: Yes  Diet:: Regular (no restrictions)  Taking medications regularly:: Yes  Medication side effects:: None  Bi-annual eye exam:: NO  Dental care twice a year:: Yes  Sleep apnea or symptoms of sleep apnea:: None  Additional concerns today:: No  Duration of exercise:: Greater than 60 minutes      "

## 2021-12-08 ASSESSMENT — PATIENT HEALTH QUESTIONNAIRE - PHQ9: SUM OF ALL RESPONSES TO PHQ QUESTIONS 1-9: 5

## 2021-12-08 ASSESSMENT — ANXIETY QUESTIONNAIRES: GAD7 TOTAL SCORE: 4

## 2022-02-15 ENCOUNTER — TRANSFERRED RECORDS (OUTPATIENT)
Dept: HEALTH INFORMATION MANAGEMENT | Facility: CLINIC | Age: 62
End: 2022-02-15
Payer: COMMERCIAL

## 2022-02-15 LAB — PHQ9 SCORE: 9

## 2022-02-18 ENCOUNTER — TRANSFERRED RECORDS (OUTPATIENT)
Dept: HEALTH INFORMATION MANAGEMENT | Facility: CLINIC | Age: 62
End: 2022-02-18
Payer: COMMERCIAL

## 2022-04-24 DIAGNOSIS — L30.9 DERMATITIS: Primary | ICD-10-CM

## 2022-04-27 RX ORDER — TRIAMCINOLONE ACETONIDE 1 MG/G
CREAM TOPICAL
Qty: 15 G | Refills: 2 | Status: SHIPPED | OUTPATIENT
Start: 2022-04-27 | End: 2023-01-06

## 2022-04-27 NOTE — TELEPHONE ENCOUNTER
"Last Written Prescription Date:  2/22/2021  Last Fill Quantity: 15 g,  # refills: 3   Last office visit provider:  12/7/2021     Requested Prescriptions   Pending Prescriptions Disp Refills     triamcinolone (KENALOG) 0.1 % external cream [Pharmacy Med Name: TRIAMCINOLONE ACE 0.1% CREAM] 15 g 3     Sig: APPLY TO AFFECTED AREA(S) TOPICALLY TWO TIMES A DAY       Topical Steroids and Nonsteroidals Protocol Passed - 4/27/2022  9:11 AM        Passed - Patient is age 6 or older        Passed - Authorizing prescriber's most recent note related to this medication read.     If refill request is for ophthalmic use, please forward request to provider for approval.          Passed - High potency steroid not ordered        Passed - Recent (12 mo) or future (30 days) visit within the authorizing provider's specialty     Patient has had an office visit with the authorizing provider or a provider within the authorizing providers department within the previous 12 mos or has a future within next 30 days. See \"Patient Info\" tab in inbasket, or \"Choose Columns\" in Meds & Orders section of the refill encounter.              Passed - Medication is active on med list             Anusha Collins RN 04/27/22 9:11 AM    "
No

## 2022-05-17 ENCOUNTER — TRANSFERRED RECORDS (OUTPATIENT)
Dept: HEALTH INFORMATION MANAGEMENT | Facility: CLINIC | Age: 62
End: 2022-05-17
Payer: COMMERCIAL

## 2022-08-16 ENCOUNTER — TRANSFERRED RECORDS (OUTPATIENT)
Dept: HEALTH INFORMATION MANAGEMENT | Facility: CLINIC | Age: 62
End: 2022-08-16

## 2022-08-16 LAB — PHQ9 SCORE: 1

## 2022-10-02 ENCOUNTER — HEALTH MAINTENANCE LETTER (OUTPATIENT)
Age: 62
End: 2022-10-02

## 2022-10-14 ENCOUNTER — ALLIED HEALTH/NURSE VISIT (OUTPATIENT)
Dept: FAMILY MEDICINE | Facility: CLINIC | Age: 62
End: 2022-10-14
Payer: COMMERCIAL

## 2022-10-14 DIAGNOSIS — Z23 ENCOUNTER FOR ADMINISTRATION OF VACCINE: Primary | ICD-10-CM

## 2022-10-14 PROCEDURE — 90682 RIV4 VACC RECOMBINANT DNA IM: CPT

## 2022-10-14 PROCEDURE — 99207 PR NO CHARGE NURSE ONLY: CPT

## 2022-10-14 PROCEDURE — 0124A COVID-19,PF,PFIZER BOOSTER BIVALENT: CPT

## 2022-10-14 PROCEDURE — 90471 IMMUNIZATION ADMIN: CPT

## 2022-10-14 PROCEDURE — 91312 COVID-19,PF,PFIZER BOOSTER BIVALENT: CPT

## 2022-11-15 ENCOUNTER — TRANSFERRED RECORDS (OUTPATIENT)
Dept: HEALTH INFORMATION MANAGEMENT | Facility: CLINIC | Age: 62
End: 2022-11-15

## 2022-12-18 DIAGNOSIS — N40.1 BENIGN PROSTATIC HYPERPLASIA WITH LOWER URINARY TRACT SYMPTOMS, SYMPTOM DETAILS UNSPECIFIED: ICD-10-CM

## 2022-12-18 RX ORDER — TAMSULOSIN HYDROCHLORIDE 0.4 MG/1
CAPSULE ORAL
Qty: 90 CAPSULE | Refills: 3 | Status: SHIPPED | OUTPATIENT
Start: 2022-12-18 | End: 2023-01-06

## 2022-12-19 NOTE — TELEPHONE ENCOUNTER
"Routing refill request to provider for review/approval because:  Blood pressures not current    Last Written Prescription Date:  12/20/2021  Last Fill Quantity: 90,  # refills: 3   Last office visit provider:  12/7/2021     Requested Prescriptions   Pending Prescriptions Disp Refills     tamsulosin (FLOMAX) 0.4 MG capsule [Pharmacy Med Name: TAMSULOSIN HCL 0.4MG CAPS] 90 capsule 3     Sig: TAKE 1 CAPSULE BY MOUTH EVERYDAY AFTER SUPPER       Alpha Blockers Failed - 12/18/2022  7:07 AM        Failed - Blood pressure under 140/90 in past 12 months     BP Readings from Last 3 Encounters:   12/07/21 124/72   07/09/21 124/72   03/26/21 120/64                 Passed - Recent (12 mo) or future (30 days) visit within the authorizing provider's specialty     Patient has had an office visit with the authorizing provider or a provider within the authorizing providers department within the previous 12 mos or has a future within next 30 days. See \"Patient Info\" tab in inbasket, or \"Choose Columns\" in Meds & Orders section of the refill encounter.              Passed - Patient does not have Tadalafil, Vardenafil, or Sildenafil on their medication list        Passed - Medication is active on med list        Passed - Patient is 18 years of age or older             Aurea Corona RN 12/18/22 6:19 PM  "

## 2022-12-30 ASSESSMENT — ENCOUNTER SYMPTOMS
ARTHRALGIAS: 0
SORE THROAT: 0
PALPITATIONS: 0
FREQUENCY: 0
MYALGIAS: 1
ABDOMINAL PAIN: 0
PARESTHESIAS: 0
EYE PAIN: 0
HEARTBURN: 0
NAUSEA: 0
DIZZINESS: 0
DIARRHEA: 0
HEMATURIA: 0
SHORTNESS OF BREATH: 0
JOINT SWELLING: 0
DYSURIA: 0
CONSTIPATION: 0
CHILLS: 0
WEAKNESS: 0
HEADACHES: 0
NERVOUS/ANXIOUS: 0
HEMATOCHEZIA: 0
FEVER: 0
COUGH: 0

## 2023-01-03 PROBLEM — F41.9 ANXIETY: Status: ACTIVE | Noted: 2023-01-03

## 2023-01-03 PROBLEM — L30.9 ECZEMA: Status: ACTIVE | Noted: 2023-01-03

## 2023-01-03 PROBLEM — J30.2 SEASONAL ALLERGIES: Status: ACTIVE | Noted: 2023-01-03

## 2023-01-03 PROBLEM — K57.30 DIVERTICULAR DISEASE OF LARGE INTESTINE: Status: ACTIVE | Noted: 2022-02-18

## 2023-01-03 PROBLEM — Z86.0100 HISTORY OF COLONIC POLYPS: Status: ACTIVE | Noted: 2022-02-22

## 2023-01-03 PROBLEM — N31.8 LOW BLADDER COMPLIANCE: Status: ACTIVE | Noted: 2023-01-03

## 2023-01-03 PROBLEM — D12.2 BENIGN NEOPLASM OF ASCENDING COLON: Status: ACTIVE | Noted: 2022-02-22

## 2023-01-03 PROBLEM — K63.5 POLYP OF COLON: Status: ACTIVE | Noted: 2022-02-18

## 2023-01-06 ENCOUNTER — OFFICE VISIT (OUTPATIENT)
Dept: FAMILY MEDICINE | Facility: CLINIC | Age: 63
End: 2023-01-06
Payer: COMMERCIAL

## 2023-01-06 VITALS
SYSTOLIC BLOOD PRESSURE: 138 MMHG | HEART RATE: 77 BPM | HEIGHT: 70 IN | BODY MASS INDEX: 21.5 KG/M2 | OXYGEN SATURATION: 99 % | WEIGHT: 150.2 LBS | RESPIRATION RATE: 18 BRPM | DIASTOLIC BLOOD PRESSURE: 76 MMHG

## 2023-01-06 DIAGNOSIS — N40.1 BENIGN PROSTATIC HYPERPLASIA WITH LOWER URINARY TRACT SYMPTOMS, SYMPTOM DETAILS UNSPECIFIED: ICD-10-CM

## 2023-01-06 DIAGNOSIS — Z12.5 SCREENING FOR PROSTATE CANCER: ICD-10-CM

## 2023-01-06 DIAGNOSIS — F41.8 DEPRESSION WITH ANXIETY: ICD-10-CM

## 2023-01-06 DIAGNOSIS — L30.9 DERMATITIS: ICD-10-CM

## 2023-01-06 DIAGNOSIS — R01.1 SYSTOLIC MURMUR: ICD-10-CM

## 2023-01-06 DIAGNOSIS — M16.12 PRIMARY OSTEOARTHRITIS OF LEFT HIP: ICD-10-CM

## 2023-01-06 DIAGNOSIS — Z00.00 ROUTINE GENERAL MEDICAL EXAMINATION AT A HEALTH CARE FACILITY: Primary | ICD-10-CM

## 2023-01-06 LAB
ALBUMIN SERPL BCG-MCNC: 4.3 G/DL (ref 3.5–5.2)
ALP SERPL-CCNC: 80 U/L (ref 40–129)
ALT SERPL W P-5'-P-CCNC: 35 U/L (ref 10–50)
ANION GAP SERPL CALCULATED.3IONS-SCNC: 14 MMOL/L (ref 7–15)
AST SERPL W P-5'-P-CCNC: 33 U/L (ref 10–50)
BILIRUB SERPL-MCNC: 0.5 MG/DL
BUN SERPL-MCNC: 17.2 MG/DL (ref 8–23)
CALCIUM SERPL-MCNC: 9.7 MG/DL (ref 8.8–10.2)
CHLORIDE SERPL-SCNC: 99 MMOL/L (ref 98–107)
CHOLEST SERPL-MCNC: 202 MG/DL
CREAT SERPL-MCNC: 1.17 MG/DL (ref 0.67–1.17)
DEPRECATED HCO3 PLAS-SCNC: 25 MMOL/L (ref 22–29)
GFR SERPL CREATININE-BSD FRML MDRD: 71 ML/MIN/1.73M2
GLUCOSE SERPL-MCNC: 87 MG/DL (ref 70–99)
HDLC SERPL-MCNC: 89 MG/DL
LDLC SERPL CALC-MCNC: 105 MG/DL
NONHDLC SERPL-MCNC: 113 MG/DL
POTASSIUM SERPL-SCNC: 3.9 MMOL/L (ref 3.4–5.3)
PROT SERPL-MCNC: 6.5 G/DL (ref 6.4–8.3)
PSA SERPL-MCNC: 1.38 NG/ML (ref 0–4.5)
SODIUM SERPL-SCNC: 138 MMOL/L (ref 136–145)
TRIGL SERPL-MCNC: 40 MG/DL

## 2023-01-06 PROCEDURE — 36415 COLL VENOUS BLD VENIPUNCTURE: CPT | Performed by: FAMILY MEDICINE

## 2023-01-06 PROCEDURE — 80061 LIPID PANEL: CPT | Performed by: FAMILY MEDICINE

## 2023-01-06 PROCEDURE — 80053 COMPREHEN METABOLIC PANEL: CPT | Performed by: FAMILY MEDICINE

## 2023-01-06 PROCEDURE — 99396 PREV VISIT EST AGE 40-64: CPT | Performed by: FAMILY MEDICINE

## 2023-01-06 PROCEDURE — G0103 PSA SCREENING: HCPCS | Performed by: FAMILY MEDICINE

## 2023-01-06 RX ORDER — MULTIVITAMIN,THERAPEUTIC
1 TABLET ORAL DAILY
COMMUNITY
End: 2024-02-19

## 2023-01-06 RX ORDER — TRIAMCINOLONE ACETONIDE 1 MG/G
CREAM TOPICAL
Qty: 15 G | Refills: 3 | Status: SHIPPED | OUTPATIENT
Start: 2023-01-06 | End: 2023-07-16

## 2023-01-06 RX ORDER — TAMSULOSIN HYDROCHLORIDE 0.4 MG/1
CAPSULE ORAL
Qty: 90 CAPSULE | Refills: 3 | Status: SHIPPED | OUTPATIENT
Start: 2023-01-06 | End: 2023-12-13

## 2023-01-06 ASSESSMENT — ANXIETY QUESTIONNAIRES
4. TROUBLE RELAXING: NOT AT ALL
7. FEELING AFRAID AS IF SOMETHING AWFUL MIGHT HAPPEN: NOT AT ALL
5. BEING SO RESTLESS THAT IT IS HARD TO SIT STILL: NOT AT ALL
7. FEELING AFRAID AS IF SOMETHING AWFUL MIGHT HAPPEN: NOT AT ALL
GAD7 TOTAL SCORE: 0
GAD7 TOTAL SCORE: 0
6. BECOMING EASILY ANNOYED OR IRRITABLE: NOT AT ALL
IF YOU CHECKED OFF ANY PROBLEMS ON THIS QUESTIONNAIRE, HOW DIFFICULT HAVE THESE PROBLEMS MADE IT FOR YOU TO DO YOUR WORK, TAKE CARE OF THINGS AT HOME, OR GET ALONG WITH OTHER PEOPLE: NOT DIFFICULT AT ALL
2. NOT BEING ABLE TO STOP OR CONTROL WORRYING: NOT AT ALL
GAD7 TOTAL SCORE: 0
3. WORRYING TOO MUCH ABOUT DIFFERENT THINGS: NOT AT ALL
1. FEELING NERVOUS, ANXIOUS, OR ON EDGE: NOT AT ALL
8. IF YOU CHECKED OFF ANY PROBLEMS, HOW DIFFICULT HAVE THESE MADE IT FOR YOU TO DO YOUR WORK, TAKE CARE OF THINGS AT HOME, OR GET ALONG WITH OTHER PEOPLE?: NOT DIFFICULT AT ALL

## 2023-01-06 ASSESSMENT — PATIENT HEALTH QUESTIONNAIRE - PHQ9
SUM OF ALL RESPONSES TO PHQ QUESTIONS 1-9: 0
10. IF YOU CHECKED OFF ANY PROBLEMS, HOW DIFFICULT HAVE THESE PROBLEMS MADE IT FOR YOU TO DO YOUR WORK, TAKE CARE OF THINGS AT HOME, OR GET ALONG WITH OTHER PEOPLE: NOT DIFFICULT AT ALL
SUM OF ALL RESPONSES TO PHQ QUESTIONS 1-9: 0

## 2023-01-06 ASSESSMENT — PAIN SCALES - GENERAL: PAINLEVEL: NO PAIN (0)

## 2023-01-06 NOTE — PROGRESS NOTES
"Vinh Cuellar  /76   Pulse 77   Resp 18   Ht 1.778 m (5' 10\")   Wt 68.1 kg (150 lb 3.2 oz)   SpO2 99%   BMI 21.55 kg/m       Assessment/Plan:                Vinh was seen today for physical and recheck medication.    Diagnoses and all orders for this visit:    Routine general medical examination at a health care facility  -     Lipid panel reflex to direct LDL Fasting    Benign prostatic hyperplasia with lower urinary tract symptoms, symptom details unspecified  -     PSA, screen  -     tamsulosin (FLOMAX) 0.4 MG capsule; TAKE 1 CAPSULE BY MOUTH EVERYDAY AFTER SUPPER Strength: 0.4 mg    Dermatitis  -     triamcinolone (KENALOG) 0.1 % external cream; APPLY TO AFFECTED AREA(S) TOPICALLY TWO TIMES A DAY Strength: 0.1 %    Systolic murmur  -     Echocardiogram Complete; Future    Screening for prostate cancer  -     PSA, screen    Primary osteoarthritis of left hip    Depression with anxiety  -     Comprehensive metabolic panel (BMP + Alb, Alk Phos, ALT, AST, Total. Bili, TP)         DISCUSSION  Soft systolic murmur localized to right sternal border possibly aortic stenosis, will arrange for echocardiogram.    Obtain labs as above.    Consider shingles vaccine.  Subjective:     HPI:    Vinh Cuellar is a 62 year old male has a history of anxiety and depression and is followed by [facility] reports everything is going well.  He has been prostatic approach if he reports no significant urinary symptoms at this time.  Discussed continuing with prostate cancer screening utilizing PSA testing.  He remains on tamsulosin.  He has hip osteoarthritis and had arthroplasty about 2 years ago he reports he is back to exercising and is doing quite well.  He had a colonoscopy in February 2022 with 5-year follow-up recommended.  Today we discussed the shingles vaccine.  He is otherwise up-to-date with vaccinations.  Reviewed other aspects of routine health prevention.    ROS:  Complete review of systems is " obtained.  Other than the specific considerations noted above complete review of systems is negative.      Objective:   Medications:  Current Outpatient Medications   Medication     amoxicillin (AMOXIL) 500 MG capsule     buPROPion (WELLBUTRIN XL) 300 MG 24 hr tablet     cholecalciferol (VITAMIN D3) 25 mcg (1000 units) capsule     fluvoxaMINE (LUVOX) 100 MG tablet     lamoTRIgine (LAMICTAL) 25 MG tablet     MOTRIN 800 MG OR TABS     multivitamin, therapeutic (THERA-VIT) TABS tablet     tamsulosin (FLOMAX) 0.4 MG capsule     triamcinolone (KENALOG) 0.1 % external cream     lamoTRIgine (LAMICTAL) 100 MG tablet     No current facility-administered medications for this visit.        Allergies:     Allergies   Allergen Reactions     Tolterodine Tartrate Er Hives     Tolterodine Hives and Rash        Social History     Socioeconomic History     Marital status: Single     Spouse name: Not on file     Number of children: Not on file     Years of education: Not on file     Highest education level: Not on file   Occupational History     Not on file   Tobacco Use     Smoking status: Never     Smokeless tobacco: Never   Vaping Use     Vaping Use: Never used   Substance and Sexual Activity     Alcohol use: No     Drug use: No     Sexual activity: Not on file   Other Topics Concern     Not on file   Social History Narrative     Not on file     Social Determinants of Health     Financial Resource Strain: Not on file   Food Insecurity: Not on file   Transportation Needs: Not on file   Physical Activity: Not on file   Stress: Not on file   Social Connections: Not on file   Intimate Partner Violence: Not on file   Housing Stability: Not on file       Family History   Problem Relation Age of Onset     EYE* Mother         glaucoma        Most Recent Immunizations   Administered Date(s) Administered     COVID-19 Vaccine 18+ (Moderna) 06/11/2021     COVID-19 Vaccine Bivalent Booster 12+ (Pfizer) 10/14/2022     COVID-19,PF,Moderna Booster  "12/07/2021     Flu, Unspecified 09/30/2016     Influenza (IIV3) PF 10/16/2012     Influenza Vaccine 50-64 or 18-64 w/egg allergy (Flublok) 10/14/2022     Influenza Vaccine >6 months (Alfuria,Fluzone) 10/10/2021     Influenza Vaccine, 6+MO IM (QUADRIVALENT W/PRESERVATIVES) 09/30/2016     Tdap (Adacel,Boostrix) 04/29/2016        Wt Readings from Last 3 Encounters:   01/06/23 68.1 kg (150 lb 3.2 oz)   12/07/21 67.5 kg (148 lb 12.8 oz)   07/09/21 67.6 kg (149 lb)        BP Readings from Last 6 Encounters:   01/06/23 138/76   12/07/21 124/72   07/09/21 124/72   03/26/21 120/64   10/23/20 124/72   01/28/20 138/72          PHYSICAL EXAM:    /76   Pulse 77   Resp 18   Ht 1.778 m (5' 10\")   Wt 68.1 kg (150 lb 3.2 oz)   SpO2 99%   BMI 21.55 kg/m           General Appearance:    Alert, cooperative, no distress   Eyes:   No scleral icterus or conjunctival irritation       Ears:    Normal TM's and external ear canals, both ears   Throat:   Lips, mucosa, and tongue normal; teeth and gums normal   Neck:   Supple, symmetrical, trachea midline, no adenopathy;        thyroid:  No enlargement/tenderness/nodules   Lungs:     Clear to auscultation bilaterally, respirations unlabored, no wheezes or crackles   Heart:    Regular rate and rhythm, soft systolic murmur heard best at right sternal border   Abdomen:    Soft, no distention, no tenderness on palpation, no masses, no organomegaly     Extremities:  No edema, no joint swelling or redness, no evidence of any injuries   Skin:  No concerning skin findings, no suspicious moles, no rashes   Neurologic:  On gross examination there is no motor or sensory deficit.  Patient walks with a normal gait           Answers for HPI/ROS submitted by the patient on 1/6/2023  If you checked off any problems, how difficult have these problems made it for you to do your work, take care of things at home, or get along with other people?: Not difficult at all  PHQ9 TOTAL SCORE: 0  STEVEN 7 TOTAL " SCORE: 0  Frequency of exercise:: 6-7 days/week  Getting at least 3 servings of Calcium per day:: Yes  Diet:: Regular (no restrictions)  Taking medications regularly:: Yes  Medication side effects:: None  Bi-annual eye exam:: Yes  Dental care twice a year:: Yes  Sleep apnea or symptoms of sleep apnea:: None  abdominal pain: No  Blood in stool: No  Blood in urine: No  chest pain: No  chills: No  congestion: No  constipation: No  cough: No  diarrhea: No  dizziness: No  ear pain: No  eye pain: No  nervous/anxious: No  fever: No  frequency: No  genital sores: No  headaches: No  hearing loss: No  heartburn: No  arthralgias: No  joint swelling: No  peripheral edema: No  mood changes: No  myalgias: Yes  nausea: No  dysuria: No  palpitations: No  Skin sensation changes: No  sore throat: No  urgency: No  rash: No  shortness of breath: No  visual disturbance: No  weakness: No  impotence: No  penile discharge: No  Additional concerns today:: No  Duration of exercise:: 45-60 minutes

## 2023-01-13 ENCOUNTER — HOSPITAL ENCOUNTER (OUTPATIENT)
Dept: CARDIOLOGY | Facility: CLINIC | Age: 63
Discharge: HOME | End: 2023-01-13
Attending: FAMILY MEDICINE | Admitting: FAMILY MEDICINE
Payer: COMMERCIAL

## 2023-01-13 DIAGNOSIS — R01.1 SYSTOLIC MURMUR: ICD-10-CM

## 2023-01-13 PROCEDURE — 93306 TTE W/DOPPLER COMPLETE: CPT | Mod: 26 | Performed by: INTERNAL MEDICINE

## 2023-01-13 PROCEDURE — 93306 TTE W/DOPPLER COMPLETE: CPT

## 2023-02-20 ENCOUNTER — TRANSFERRED RECORDS (OUTPATIENT)
Dept: HEALTH INFORMATION MANAGEMENT | Facility: CLINIC | Age: 63
End: 2023-02-20

## 2023-05-16 ENCOUNTER — TRANSFERRED RECORDS (OUTPATIENT)
Dept: HEALTH INFORMATION MANAGEMENT | Facility: CLINIC | Age: 63
End: 2023-05-16
Payer: COMMERCIAL

## 2023-07-16 DIAGNOSIS — L30.9 DERMATITIS: ICD-10-CM

## 2023-07-16 RX ORDER — TRIAMCINOLONE ACETONIDE 1 MG/G
CREAM TOPICAL
Qty: 15 G | Refills: 1 | Status: SHIPPED | OUTPATIENT
Start: 2023-07-16 | End: 2024-02-19

## 2023-07-16 NOTE — TELEPHONE ENCOUNTER
"Last Written Prescription Date:  1/6/23  Last Fill Quantity: 15 g,  # refills: 3   Last office visit provider:  1/6/21     Requested Prescriptions   Pending Prescriptions Disp Refills     triamcinolone (KENALOG) 0.1 % external cream [Pharmacy Med Name: TRIAMCINOLONE ACE 0.1% CREAM] 15 g 2     Sig: APPLY TO AFFECTED AREA(S) TOPICALLY TWO TIMES A DAY       Topical Steroids and Nonsteroidals Protocol Passed - 7/16/2023 11:41 AM        Passed - Patient is age 6 or older        Passed - Authorizing prescriber's most recent note related to this medication read.     If refill request is for ophthalmic use, please forward request to provider for approval.          Passed - High potency steroid not ordered        Passed - Recent (12 mo) or future (30 days) visit within the authorizing provider's specialty     Patient has had an office visit with the authorizing provider or a provider within the authorizing providers department within the previous 12 mos or has a future within next 30 days. See \"Patient Info\" tab in inbasket, or \"Choose Columns\" in Meds & Orders section of the refill encounter.              Passed - Medication is active on med list             Gloria Scott 07/16/23 11:42 AM  "

## 2023-08-15 ENCOUNTER — TRANSFERRED RECORDS (OUTPATIENT)
Dept: HEALTH INFORMATION MANAGEMENT | Facility: CLINIC | Age: 63
End: 2023-08-15
Payer: COMMERCIAL

## 2023-11-15 ENCOUNTER — TRANSFERRED RECORDS (OUTPATIENT)
Dept: HEALTH INFORMATION MANAGEMENT | Facility: CLINIC | Age: 63
End: 2023-11-15
Payer: COMMERCIAL

## 2023-11-15 LAB — PHQ9 SCORE: 2

## 2023-12-12 DIAGNOSIS — N40.1 BENIGN PROSTATIC HYPERPLASIA WITH LOWER URINARY TRACT SYMPTOMS, SYMPTOM DETAILS UNSPECIFIED: ICD-10-CM

## 2023-12-13 RX ORDER — TAMSULOSIN HYDROCHLORIDE 0.4 MG/1
CAPSULE ORAL
Qty: 90 CAPSULE | Refills: 0 | Status: SHIPPED | OUTPATIENT
Start: 2023-12-13 | End: 2024-02-19

## 2023-12-21 ENCOUNTER — PATIENT OUTREACH (OUTPATIENT)
Dept: GASTROENTEROLOGY | Facility: CLINIC | Age: 63
End: 2023-12-21
Payer: COMMERCIAL

## 2024-02-12 RX ORDER — SODIUM FLUORIDE 6 MG/ML
PASTE, DENTIFRICE DENTAL DAILY
COMMUNITY
Start: 2023-05-28

## 2024-02-16 SDOH — HEALTH STABILITY: PHYSICAL HEALTH: ON AVERAGE, HOW MANY DAYS PER WEEK DO YOU ENGAGE IN MODERATE TO STRENUOUS EXERCISE (LIKE A BRISK WALK)?: 6 DAYS

## 2024-02-16 SDOH — HEALTH STABILITY: PHYSICAL HEALTH: ON AVERAGE, HOW MANY MINUTES DO YOU ENGAGE IN EXERCISE AT THIS LEVEL?: 90 MIN

## 2024-02-16 ASSESSMENT — SOCIAL DETERMINANTS OF HEALTH (SDOH): HOW OFTEN DO YOU GET TOGETHER WITH FRIENDS OR RELATIVES?: ONCE A WEEK

## 2024-02-19 ENCOUNTER — OFFICE VISIT (OUTPATIENT)
Dept: FAMILY MEDICINE | Facility: CLINIC | Age: 64
End: 2024-02-19
Payer: COMMERCIAL

## 2024-02-19 ENCOUNTER — TRANSFERRED RECORDS (OUTPATIENT)
Dept: HEALTH INFORMATION MANAGEMENT | Facility: CLINIC | Age: 64
End: 2024-02-19

## 2024-02-19 VITALS
DIASTOLIC BLOOD PRESSURE: 66 MMHG | OXYGEN SATURATION: 99 % | HEIGHT: 70 IN | SYSTOLIC BLOOD PRESSURE: 120 MMHG | RESPIRATION RATE: 18 BRPM | HEART RATE: 66 BPM | WEIGHT: 148 LBS | BODY MASS INDEX: 21.19 KG/M2 | TEMPERATURE: 98.6 F

## 2024-02-19 DIAGNOSIS — E78.5 DYSLIPIDEMIA: ICD-10-CM

## 2024-02-19 DIAGNOSIS — L30.9 DERMATITIS: ICD-10-CM

## 2024-02-19 DIAGNOSIS — F41.8 DEPRESSION WITH ANXIETY: ICD-10-CM

## 2024-02-19 DIAGNOSIS — Z00.00 ROUTINE GENERAL MEDICAL EXAMINATION AT A HEALTH CARE FACILITY: Primary | ICD-10-CM

## 2024-02-19 DIAGNOSIS — Z12.5 SCREENING FOR PROSTATE CANCER: ICD-10-CM

## 2024-02-19 DIAGNOSIS — N40.1 BENIGN PROSTATIC HYPERPLASIA WITH LOWER URINARY TRACT SYMPTOMS, SYMPTOM DETAILS UNSPECIFIED: ICD-10-CM

## 2024-02-19 LAB
ALBUMIN SERPL BCG-MCNC: 4.4 G/DL (ref 3.5–5.2)
ALP SERPL-CCNC: 93 U/L (ref 40–150)
ALT SERPL W P-5'-P-CCNC: 26 U/L (ref 0–70)
ANION GAP SERPL CALCULATED.3IONS-SCNC: 9 MMOL/L (ref 7–15)
AST SERPL W P-5'-P-CCNC: 33 U/L (ref 0–45)
BILIRUB SERPL-MCNC: 0.5 MG/DL
BUN SERPL-MCNC: 17.3 MG/DL (ref 8–23)
CALCIUM SERPL-MCNC: 9.7 MG/DL (ref 8.8–10.2)
CHLORIDE SERPL-SCNC: 100 MMOL/L (ref 98–107)
CHOLEST SERPL-MCNC: 200 MG/DL
CREAT SERPL-MCNC: 1.06 MG/DL (ref 0.67–1.17)
DEPRECATED HCO3 PLAS-SCNC: 29 MMOL/L (ref 22–29)
EGFRCR SERPLBLD CKD-EPI 2021: 79 ML/MIN/1.73M2
FASTING STATUS PATIENT QL REPORTED: YES
GLUCOSE SERPL-MCNC: 87 MG/DL (ref 70–99)
HDLC SERPL-MCNC: 87 MG/DL
LDLC SERPL CALC-MCNC: 106 MG/DL
NONHDLC SERPL-MCNC: 113 MG/DL
POTASSIUM SERPL-SCNC: 4.7 MMOL/L (ref 3.4–5.3)
PROT SERPL-MCNC: 6.9 G/DL (ref 6.4–8.3)
PSA SERPL DL<=0.01 NG/ML-MCNC: 1.46 NG/ML (ref 0–4.5)
SODIUM SERPL-SCNC: 138 MMOL/L (ref 135–145)
TRIGL SERPL-MCNC: 35 MG/DL

## 2024-02-19 PROCEDURE — 80053 COMPREHEN METABOLIC PANEL: CPT | Performed by: FAMILY MEDICINE

## 2024-02-19 PROCEDURE — 99396 PREV VISIT EST AGE 40-64: CPT | Mod: 25 | Performed by: FAMILY MEDICINE

## 2024-02-19 PROCEDURE — 90472 IMMUNIZATION ADMIN EACH ADD: CPT | Performed by: FAMILY MEDICINE

## 2024-02-19 PROCEDURE — 80061 LIPID PANEL: CPT | Performed by: FAMILY MEDICINE

## 2024-02-19 PROCEDURE — 36415 COLL VENOUS BLD VENIPUNCTURE: CPT | Performed by: FAMILY MEDICINE

## 2024-02-19 PROCEDURE — 90750 HZV VACC RECOMBINANT IM: CPT | Performed by: FAMILY MEDICINE

## 2024-02-19 PROCEDURE — 90678 RSV VACC PREF BIVALENT IM: CPT | Performed by: FAMILY MEDICINE

## 2024-02-19 PROCEDURE — G0103 PSA SCREENING: HCPCS | Performed by: FAMILY MEDICINE

## 2024-02-19 PROCEDURE — 90471 IMMUNIZATION ADMIN: CPT | Performed by: FAMILY MEDICINE

## 2024-02-19 RX ORDER — CLINDAMYCIN HCL 300 MG
300 CAPSULE ORAL 4 TIMES DAILY
COMMUNITY

## 2024-02-19 RX ORDER — TAMSULOSIN HYDROCHLORIDE 0.4 MG/1
CAPSULE ORAL
Qty: 90 CAPSULE | Refills: 3 | Status: SHIPPED | OUTPATIENT
Start: 2024-02-19

## 2024-02-19 RX ORDER — TRIAMCINOLONE ACETONIDE 1 MG/G
CREAM TOPICAL
Qty: 30 G | Refills: 3 | Status: SHIPPED | OUTPATIENT
Start: 2024-02-19

## 2024-02-19 ASSESSMENT — PATIENT HEALTH QUESTIONNAIRE - PHQ9
10. IF YOU CHECKED OFF ANY PROBLEMS, HOW DIFFICULT HAVE THESE PROBLEMS MADE IT FOR YOU TO DO YOUR WORK, TAKE CARE OF THINGS AT HOME, OR GET ALONG WITH OTHER PEOPLE: NOT DIFFICULT AT ALL
SUM OF ALL RESPONSES TO PHQ QUESTIONS 1-9: 3
SUM OF ALL RESPONSES TO PHQ QUESTIONS 1-9: 3

## 2024-02-19 ASSESSMENT — PAIN SCALES - GENERAL: PAINLEVEL: NO PAIN (0)

## 2024-02-19 ASSESSMENT — ANXIETY QUESTIONNAIRES
1. FEELING NERVOUS, ANXIOUS, OR ON EDGE: NOT AT ALL
6. BECOMING EASILY ANNOYED OR IRRITABLE: NOT AT ALL
7. FEELING AFRAID AS IF SOMETHING AWFUL MIGHT HAPPEN: NOT AT ALL
4. TROUBLE RELAXING: NOT AT ALL
2. NOT BEING ABLE TO STOP OR CONTROL WORRYING: NOT AT ALL
3. WORRYING TOO MUCH ABOUT DIFFERENT THINGS: NOT AT ALL
GAD7 TOTAL SCORE: 0
IF YOU CHECKED OFF ANY PROBLEMS ON THIS QUESTIONNAIRE, HOW DIFFICULT HAVE THESE PROBLEMS MADE IT FOR YOU TO DO YOUR WORK, TAKE CARE OF THINGS AT HOME, OR GET ALONG WITH OTHER PEOPLE: NOT DIFFICULT AT ALL
8. IF YOU CHECKED OFF ANY PROBLEMS, HOW DIFFICULT HAVE THESE MADE IT FOR YOU TO DO YOUR WORK, TAKE CARE OF THINGS AT HOME, OR GET ALONG WITH OTHER PEOPLE?: NOT DIFFICULT AT ALL
5. BEING SO RESTLESS THAT IT IS HARD TO SIT STILL: NOT AT ALL
7. FEELING AFRAID AS IF SOMETHING AWFUL MIGHT HAPPEN: NOT AT ALL
GAD7 TOTAL SCORE: 0
GAD7 TOTAL SCORE: 0

## 2024-02-19 NOTE — PROGRESS NOTES
Prior to immunization administration, verified patients identity using patient s name and date of birth. Please see Immunization Activity for additional information.     Screening Questionnaire for Adult Immunization    Are you sick today?   No   Do you have allergies to medications, food, a vaccine component or latex?   No   Have you ever had a serious reaction after receiving a vaccination?   No   Do you have a long-term health problem with heart, lung, kidney, or metabolic disease (e.g., diabetes), asthma, a blood disorder, no spleen, complement component deficiency, a cochlear implant, or a spinal fluid leak?  Are you on long-term aspirin therapy?   No   Do you have cancer, leukemia, HIV/AIDS, or any other immune system problem?   No   Do you have a parent, brother, or sister with an immune system problem?   No   In the past 3 months, have you taken medications that affect  your immune system, such as prednisone, other steroids, or anticancer drugs; drugs for the treatment of rheumatoid arthritis, Crohn s disease, or psoriasis; or have you had radiation treatments?   No   Have you had a seizure, or a brain or other nervous system problem?   No   During the past year, have you received a transfusion of blood or blood    products, or been given immune (gamma) globulin or antiviral drug?   No   For women: Are you pregnant or is there a chance you could become       pregnant during the next month?   No   Have you received any vaccinations in the past 4 weeks?   No     Immunization questionnaire answers were all negative.      Patient instructed to remain in clinic for 15 minutes afterwards, and to report any adverse reactions.     Screening performed by Roland Spicer MA on 2/19/2024 at 2:29 PM.   Answers submitted by the patient for this visit:  Patient Health Questionnaire (Submitted on 2/19/2024)  If you checked off any problems, how difficult have these problems made it for you to do your work, take care of  things at home, or get along with other people?: Not difficult at all  PHQ9 TOTAL SCORE: 3  STEVEN-7 (Submitted on 2/19/2024)  STEVEN 7 TOTAL SCORE: 0

## 2024-02-19 NOTE — PROGRESS NOTES
Preventive Care Visit  [facility]  Bruce Piedra MD, MD, Family Medicine  Feb 19, 2024    Assessment & Plan     Vinh was seen today for recheck medication and physical.    Diagnoses and all orders for this visit:    Routine general medical examination at a health care facility    Benign prostatic hyperplasia with lower urinary tract symptoms, symptom details unspecified  -     tamsulosin (FLOMAX) 0.4 MG capsule; TAKE ONE CAPSULE BY MOUTH EVERY DAY AFTER SUPPER    Dermatitis  -     triamcinolone (KENALOG) 0.1 % external cream; APPLY TO AFFECTED AREA(S) TOPICALLY TWO TIMES A DAY    Screening for prostate cancer  -     PSA, screen; Future    Depression with anxiety  -     Comprehensive metabolic panel (BMP + Alb, Alk Phos, ALT, AST, Total. Bili, TP); Future    Dyslipidemia  -     Comprehensive metabolic panel (BMP + Alb, Alk Phos, ALT, AST, Total. Bili, TP); Future  -     Lipid panel reflex to direct LDL Fasting; Future    Other orders  -     ZOSTER RECOMBINANT ADJUVANTED (SHINGRIX)  -     RSV VACCINE (ABRYSVO)                   Counseling  Appropriate preventive services were discussed with this patient, including applicable screening as appropriate for fall prevention, nutrition, physical activity, Tobacco-use cessation, weight loss and cognition.  Checklist reviewing preventive services available has been given to the patient.  Reviewed patient's diet, addressing concerns and/or questions.     Patient has been advised of split billing requirements and indicates understanding: Yes        Subjective   Vinh is a 63 year old, presenting for the following:  Recheck Medication and Physical      He has a history of anxiety depression and is managed with medications by his psychiatrist at [facility].  He reports everything is going well with his mental health.  He has a benign prostatic hypertrophy takes tamsulosin and reports good symptom benefit without concern.  Discussed continued  utilization of PSA test for prostate cancer screening.  He has a history of hip osteoarthritis and had arthroplasty 2 to 3 years ago he is exercising regular without concern.  Reviewed routine health preventive measures including colonoscopy, immunizations, continued surveillance of his cholesterol which is mildly elevated.    Health Care Directive  Patient does not have a Health Care Directive or Living Will: Discussed advance care planning with patient; information given to patient to review.    HPI  Answers submitted by the patient for this visit:  Patient Health Questionnaire (Submitted on 2/19/2024)  If you checked off any problems, how difficult have these problems made it for you to do your work, take care of things at home, or get along with other people?: Not difficult at all  PHQ9 TOTAL SCORE: 3  STEVEN-7 (Submitted on 2/19/2024)  STEVEN 7 TOTAL SCORE: 0              2/16/2024   General Health   How would you rate your overall physical health? Excellent   Feel stress (tense, anxious, or unable to sleep) Not at all         2/16/2024   Nutrition   Three or more servings of calcium each day? Yes   Diet: Other   If other, please elaborate: I do not eat red meat.   How many servings of fruit and vegetables per day? 4 or more   How many sweetened beverages each day? 0-1         2/16/2024   Exercise   Days per week of moderate/strenous exercise 6 days   Average minutes spent exercising at this level 90 min         2/16/2024   Social Factors   Frequency of gathering with friends or relatives Once a week   Worry food won't last until get money to buy more No   Food not last or not have enough money for food? No   Do you have housing?  Yes   Are you worried about losing your housing? No   Lack of transportation? No   Unable to get utilities (heat,electricity)? No         2/16/2024   Fall Risk   Fallen 2 or more times in the past year? No   Trouble with walking or balance? No          2/16/2024   Dental   Dentist two times  "every year? Yes         2/16/2024   TB Screening   Were you born outside of [place]?  No       Today's PHQ-9 Score:       2/19/2024    10:26 AM   PHQ-9 SCORE   PHQ-9 Total Score MyChart 3 (Minimal depression)   PHQ-9 Total Score 3         2/16/2024   Substance Use   Alcohol more than 3/day or more than 7/wk Not Applicable   Do you use any other substances recreationally? No     Social History     Tobacco Use    Smoking status: Never    Smokeless tobacco: Never   Vaping Use    Vaping Use: Never used   Substance Use Topics    Alcohol use: No    Drug use: No           2/16/2024   STI Screening   New sexual partner(s) since last STI/HIV test? No   Last PSA:   PSA   Date Value Ref Range Status   03/13/2002 1.2 0 - 4 ng/mL      Prostate Specific Antigen Screen   Date Value Ref Range Status   01/06/2023 1.38 0.00 - 4.50 ng/mL Final   12/07/2021 0.98 0.00 - 4.50 ug/L Final     The 10-year ASCVD risk score (Princess NAGY, et al., 2019) is: 6.3%    Values used to calculate the score:      Age: 63 years      Sex: Male      Is Non- : No      Diabetic: No      Tobacco smoker: No      Systolic Blood Pressure: 120 mmHg      Is BP treated: No      HDL Cholesterol: 89 mg/dL      Total Cholesterol: 202 mg/dL           Reviewed and updated as needed this visit by Provider                        Complete review of systems is obtained.  Other than the specific considerations noted above complete review of systems is negative.       Objective    Exam  /66   Pulse 66   Temp 98.6  F (37  C)   Resp 18   Ht 1.778 m (5' 10\")   Wt 67.1 kg (148 lb)   SpO2 99%   BMI 21.24 kg/m     Estimated body mass index is 21.24 kg/m  as calculated from the following:    Height as of this encounter: 1.778 m (5' 10\").    Weight as of this encounter: 67.1 kg (148 lb).    Physical Exam        General Appearance:    Alert, cooperative, no distress   Eyes:   No scleral icterus or conjunctival irritation       Ears:    Normal TM's and " external ear canals, both ears   Throat:   Lips, mucosa, and tongue normal; teeth and gums normal   Neck:   Supple, symmetrical, trachea midline, no adenopathy;        thyroid:  No enlargement/tenderness/nodules   Lungs:     Clear to auscultation bilaterally, respirations unlabored, no wheezes or crackles   Heart:    Regular rate and rhythm,  No murmur   Abdomen:    Soft, no distention, no tenderness on palpation, no masses, no organomegaly, a small soft tissue lump near the umbilicus is unchanged     Extremities:  No edema, no joint swelling or redness, no evidence of any injuries   Skin:  No concerning skin findings, no suspicious moles, no rashes   Neurologic:  On gross examination there is no motor or sensory deficit.  Patient walks with a normal gait                 Signed Electronically by: Bruce Piedra MD, MD

## 2024-05-22 ENCOUNTER — TRANSFERRED RECORDS (OUTPATIENT)
Dept: HEALTH INFORMATION MANAGEMENT | Facility: CLINIC | Age: 64
End: 2024-05-22
Payer: COMMERCIAL

## 2024-08-04 PROCEDURE — 96361 HYDRATE IV INFUSION ADD-ON: CPT

## 2024-08-04 PROCEDURE — 96375 TX/PRO/DX INJ NEW DRUG ADDON: CPT

## 2024-08-04 PROCEDURE — 96374 THER/PROPH/DIAG INJ IV PUSH: CPT

## 2024-08-04 PROCEDURE — 99285 EMERGENCY DEPT VISIT HI MDM: CPT | Mod: 25

## 2024-08-04 ASSESSMENT — COLUMBIA-SUICIDE SEVERITY RATING SCALE - C-SSRS
1. IN THE PAST MONTH, HAVE YOU WISHED YOU WERE DEAD OR WISHED YOU COULD GO TO SLEEP AND NOT WAKE UP?: NO
2. HAVE YOU ACTUALLY HAD ANY THOUGHTS OF KILLING YOURSELF IN THE PAST MONTH?: NO
6. HAVE YOU EVER DONE ANYTHING, STARTED TO DO ANYTHING, OR PREPARED TO DO ANYTHING TO END YOUR LIFE?: NO

## 2024-08-05 ENCOUNTER — APPOINTMENT (OUTPATIENT)
Dept: CT IMAGING | Facility: CLINIC | Age: 64
End: 2024-08-05
Attending: EMERGENCY MEDICINE
Payer: COMMERCIAL

## 2024-08-05 ENCOUNTER — HOSPITAL ENCOUNTER (EMERGENCY)
Facility: CLINIC | Age: 64
Discharge: HOME | End: 2024-08-05
Attending: EMERGENCY MEDICINE | Admitting: EMERGENCY MEDICINE
Payer: COMMERCIAL

## 2024-08-05 VITALS
BODY MASS INDEX: 21.19 KG/M2 | WEIGHT: 148 LBS | RESPIRATION RATE: 21 BRPM | DIASTOLIC BLOOD PRESSURE: 82 MMHG | OXYGEN SATURATION: 99 % | SYSTOLIC BLOOD PRESSURE: 127 MMHG | HEIGHT: 70 IN | HEART RATE: 68 BPM | TEMPERATURE: 97.2 F

## 2024-08-05 DIAGNOSIS — R33.9 URINARY RETENTION: ICD-10-CM

## 2024-08-05 DIAGNOSIS — K59.00 CONSTIPATION, UNSPECIFIED CONSTIPATION TYPE: ICD-10-CM

## 2024-08-05 DIAGNOSIS — R82.71 BACTERIA IN URINE: ICD-10-CM

## 2024-08-05 LAB
ALBUMIN SERPL BCG-MCNC: 4.6 G/DL (ref 3.5–5.2)
ALBUMIN UR-MCNC: 20 MG/DL
ALP SERPL-CCNC: 93 U/L (ref 40–150)
ALT SERPL W P-5'-P-CCNC: 24 U/L (ref 0–70)
AMORPH CRY #/AREA URNS HPF: ABNORMAL /HPF
ANION GAP SERPL CALCULATED.3IONS-SCNC: 17 MMOL/L (ref 7–15)
APPEARANCE UR: ABNORMAL
AST SERPL W P-5'-P-CCNC: 31 U/L (ref 0–45)
ATRIAL RATE - MUSE: 67 BPM
BACTERIA #/AREA URNS HPF: ABNORMAL /HPF
BILIRUB DIRECT SERPL-MCNC: <0.2 MG/DL (ref 0–0.3)
BILIRUB SERPL-MCNC: 0.6 MG/DL
BILIRUB UR QL STRIP: NEGATIVE
BUN SERPL-MCNC: 23.8 MG/DL (ref 8–23)
CALCIUM SERPL-MCNC: 10.1 MG/DL (ref 8.8–10.4)
CHLORIDE SERPL-SCNC: 98 MMOL/L (ref 98–107)
COLOR UR AUTO: YELLOW
CREAT SERPL-MCNC: 1.18 MG/DL (ref 0.67–1.17)
DIASTOLIC BLOOD PRESSURE - MUSE: NORMAL MMHG
EGFRCR SERPLBLD CKD-EPI 2021: 70 ML/MIN/1.73M2
ERYTHROCYTE [DISTWIDTH] IN BLOOD BY AUTOMATED COUNT: 13 % (ref 10–15)
GLUCOSE SERPL-MCNC: 183 MG/DL (ref 70–99)
GLUCOSE UR STRIP-MCNC: NEGATIVE MG/DL
HCO3 SERPL-SCNC: 23 MMOL/L (ref 22–29)
HCT VFR BLD AUTO: 43.3 % (ref 40–53)
HGB BLD-MCNC: 15.4 G/DL (ref 13.3–17.7)
HGB UR QL STRIP: NEGATIVE
HOLD SPECIMEN: NORMAL
INTERPRETATION ECG - MUSE: NORMAL
KETONES UR STRIP-MCNC: ABNORMAL MG/DL
LEUKOCYTE ESTERASE UR QL STRIP: NEGATIVE
LIPASE SERPL-CCNC: 42 U/L (ref 13–60)
MCH RBC QN AUTO: 30 PG (ref 26.5–33)
MCHC RBC AUTO-ENTMCNC: 35.6 G/DL (ref 31.5–36.5)
MCV RBC AUTO: 84 FL (ref 78–100)
MUCOUS THREADS #/AREA URNS LPF: PRESENT /LPF
NITRATE UR QL: NEGATIVE
P AXIS - MUSE: 57 DEGREES
PH UR STRIP: 8 [PH] (ref 5–7)
PLATELET # BLD AUTO: 292 10E3/UL (ref 150–450)
POTASSIUM SERPL-SCNC: 4.4 MMOL/L (ref 3.4–5.3)
PR INTERVAL - MUSE: 146 MS
PROT SERPL-MCNC: 7.3 G/DL (ref 6.4–8.3)
QRS DURATION - MUSE: 104 MS
QT - MUSE: 460 MS
QTC - MUSE: 486 MS
R AXIS - MUSE: 64 DEGREES
RBC # BLD AUTO: 5.13 10E6/UL (ref 4.4–5.9)
RBC URINE: 2 /HPF
SODIUM SERPL-SCNC: 138 MMOL/L (ref 135–145)
SP GR UR STRIP: 1.02 (ref 1–1.03)
SYSTOLIC BLOOD PRESSURE - MUSE: NORMAL MMHG
T AXIS - MUSE: 54 DEGREES
TROPONIN T SERPL HS-MCNC: 13 NG/L
TROPONIN T SERPL HS-MCNC: 14 NG/L
UROBILINOGEN UR STRIP-MCNC: <2 MG/DL
VENTRICULAR RATE- MUSE: 67 BPM
WBC # BLD AUTO: 16.6 10E3/UL (ref 4–11)
WBC URINE: 3 /HPF

## 2024-08-05 PROCEDURE — 250N000011 HC RX IP 250 OP 636: Performed by: EMERGENCY MEDICINE

## 2024-08-05 PROCEDURE — 82248 BILIRUBIN DIRECT: CPT | Performed by: EMERGENCY MEDICINE

## 2024-08-05 PROCEDURE — 36415 COLL VENOUS BLD VENIPUNCTURE: CPT | Performed by: EMERGENCY MEDICINE

## 2024-08-05 PROCEDURE — 85027 COMPLETE CBC AUTOMATED: CPT | Performed by: EMERGENCY MEDICINE

## 2024-08-05 PROCEDURE — 81001 URINALYSIS AUTO W/SCOPE: CPT | Performed by: EMERGENCY MEDICINE

## 2024-08-05 PROCEDURE — 258N000003 HC RX IP 258 OP 636: Performed by: EMERGENCY MEDICINE

## 2024-08-05 PROCEDURE — 84484 ASSAY OF TROPONIN QUANT: CPT | Performed by: EMERGENCY MEDICINE

## 2024-08-05 PROCEDURE — 93005 ELECTROCARDIOGRAM TRACING: CPT | Performed by: EMERGENCY MEDICINE

## 2024-08-05 PROCEDURE — 83690 ASSAY OF LIPASE: CPT | Performed by: EMERGENCY MEDICINE

## 2024-08-05 PROCEDURE — 74177 CT ABD & PELVIS W/CONTRAST: CPT

## 2024-08-05 PROCEDURE — 250N000013 HC RX MED GY IP 250 OP 250 PS 637: Performed by: EMERGENCY MEDICINE

## 2024-08-05 PROCEDURE — 80048 BASIC METABOLIC PNL TOTAL CA: CPT | Performed by: EMERGENCY MEDICINE

## 2024-08-05 RX ORDER — MAGNESIUM HYDROXIDE/ALUMINUM HYDROXICE/SIMETHICONE 120; 1200; 1200 MG/30ML; MG/30ML; MG/30ML
15 SUSPENSION ORAL ONCE
Status: COMPLETED | OUTPATIENT
Start: 2024-08-05 | End: 2024-08-05

## 2024-08-05 RX ORDER — IOPAMIDOL 755 MG/ML
100 INJECTION, SOLUTION INTRAVASCULAR ONCE
Status: COMPLETED | OUTPATIENT
Start: 2024-08-05 | End: 2024-08-05

## 2024-08-05 RX ORDER — CEFDINIR 300 MG/1
300 CAPSULE ORAL 2 TIMES DAILY
Qty: 14 CAPSULE | Refills: 0 | Status: SHIPPED | OUTPATIENT
Start: 2024-08-05

## 2024-08-05 RX ORDER — ONDANSETRON 2 MG/ML
4 INJECTION INTRAMUSCULAR; INTRAVENOUS ONCE
Status: COMPLETED | OUTPATIENT
Start: 2024-08-05 | End: 2024-08-05

## 2024-08-05 RX ORDER — POLYETHYLENE GLYCOL 3350 17 G/17G
1 POWDER, FOR SOLUTION ORAL DAILY
Qty: 527 G | Refills: 0 | Status: SHIPPED | OUTPATIENT
Start: 2024-08-05 | End: 2024-09-04

## 2024-08-05 RX ORDER — KETOROLAC TROMETHAMINE 15 MG/ML
15 INJECTION, SOLUTION INTRAMUSCULAR; INTRAVENOUS ONCE
Status: COMPLETED | OUTPATIENT
Start: 2024-08-05 | End: 2024-08-05

## 2024-08-05 RX ADMIN — IOPAMIDOL 100 ML: 755 INJECTION, SOLUTION INTRAVENOUS at 02:36

## 2024-08-05 RX ADMIN — KETOROLAC TROMETHAMINE 15 MG: 15 INJECTION, SOLUTION INTRAMUSCULAR; INTRAVENOUS at 01:29

## 2024-08-05 RX ADMIN — ONDANSETRON 4 MG: 2 INJECTION INTRAMUSCULAR; INTRAVENOUS at 01:29

## 2024-08-05 RX ADMIN — SODIUM CHLORIDE 500 ML: 9 INJECTION, SOLUTION INTRAVENOUS at 01:29

## 2024-08-05 RX ADMIN — ALUMINUM HYDROXIDE, MAGNESIUM HYDROXIDE, AND SIMETHICONE 15 ML: 200; 200; 20 SUSPENSION ORAL at 03:45

## 2024-08-05 ASSESSMENT — ENCOUNTER SYMPTOMS
DIARRHEA: 0
DYSURIA: 0
VOMITING: 0
ABDOMINAL PAIN: 1

## 2024-08-05 ASSESSMENT — ACTIVITIES OF DAILY LIVING (ADL)
ADLS_ACUITY_SCORE: 35
ADLS_ACUITY_SCORE: 33

## 2024-08-05 NOTE — DISCHARGE INSTRUCTIONS
As we discussed your CT does not show a bowel obstruction.  Your CT does show a lot of stool in your large intestine therefore do recommend MiraLAX daily to help make your stool little softer.  He did have some bacteria in her urine therefore recommend Omnicef twice a day.  Return to the ER if you develop fever or worsening pain.  Otherwise recheck with your primary care doctor.  Resume your Flomax

## 2024-08-05 NOTE — ED NOTES
Pt had bladder scan of 705 voided 250, pt states he also did not take his flomax on Sunday and when he does not take it, he stated this is what happens.

## 2024-08-05 NOTE — ED TRIAGE NOTES
Pt arrived via EMS from home. Pt reports onset of abdominal pain this afternoon which has become severe. Denies NVD. Had normal BM this AM. Hx of SBO several years ago.     Triage Assessment (Adult)       Row Name 08/04/24 [id number]          Triage Assessment    Airway WDL WDL        Respiratory WDL    Respiratory WDL WDL        Skin Circulation/Temperature WDL    Skin Circulation/Temperature WDL WDL        Cardiac WDL    Cardiac WDL WDL        Peripheral/Neurovascular WDL    Peripheral Neurovascular WDL WDL        Cognitive/Neuro/Behavioral WDL    Cognitive/Neuro/Behavioral WDL WDL

## 2024-08-05 NOTE — ED PROVIDER NOTES
EMERGENCY DEPARTMENT ENCOUNTER      NAME: Vinh Cuellar  AGE: 63 year old male  YOB: 1960  MRN: [id number]  EVALUATION DATE & TIME: No admission date for patient encounter.    PCP: Bruce Piedra    ED PROVIDER: Joe Phillip MD      Chief Complaint   Patient presents with    Abdominal Pain         FINAL IMPRESSION:  1. Constipation, unspecified constipation type    2. Urinary retention    3. Bacteria in urine          ED COURSE & MEDICAL DECISION MAKING:    Pertinent Labs & Imaging studies reviewed. (See chart for details)  63 year old male presents to the Emergency Department for evaluation of generalized abdominal pain but mostly epigastric region that developed this afternoon.  Toradol and Zofran IV fluids and now feels better    No abdominal tenderness.    Differential includes pancreatitis, colitis, that ulcer disease, appendicitis, ACS, gastritis, gas pain, constipation, bowel obstruction    Will obtain urine, troponin, EKG, hepatic function panel, lipase, BMP CBC, CT abdomen pelvis    CT shows large amount of stool in the colon as well as changes just of gastroenteritis that fits with white blood cell count being 16, clinically pneumonia unlikely.  Urine shows some bacteria.  Patient does have some mild urinary retention but states he did not take his Flomax.  Currently I do not see an indication for a urinary catheter    Patient is doing better after treatments.  Plan for discharge home with Omnicef based on the bacteria in the urine as well as MiraLAX for the large amount of stool in his large intestine    Pains likely from gastroenteritis running into large amount of stool in his large intestine.  Suspect this pain should improve with MiraLAX    Patient will return to ER for worsening pain or fever    Patient is stooling therefore I doubt bowel obstruction    Benign abdominal exam    ED Course as of 08/05/24 0450   Mon Aug 05, 2024   0355 Bladder scan 705 urinate 250 but states he did  not take his Flomax, will have patient follow-up with urology     4:38 AM I am updating the patient.     Medical Decision Making  Obtained supplemental history:Supplemental history obtained?: Documented in chart  Reviewed external records: External records reviewed?: Documented in chart  Care impacted by chronic illness:Mental Health  Care significantly affected by social determinants of health:Access to Medical Care  Did you consider but not order tests?: Work up considered but not performed and documented in chart, if applicable  Did you interpret images independently?: Independent interpretation of ECG and images noted in documentation, when applicable.  Consultation discussion with other provider:Did you involve another provider (consultant, , pharmacy, etc.)?: No  Discharge. I prescribed additional prescription strength medication(s) as charted. N/A.        At the conclusion of the encounter I discussed the results of all of the tests and the disposition. The questions were answered. The patient or family acknowledged understanding and was agreeable with the care plan.         MEDICATIONS GIVEN IN THE EMERGENCY:  Medications   sodium chloride 0.9% BOLUS 500 mL (0 mLs Intravenous Stopped 8/5/24 0307)   ondansetron (ZOFRAN) injection 4 mg (4 mg Intravenous $Given 8/5/24 0129)   ketorolac (TORADOL) injection 15 mg (15 mg Intravenous $Given 8/5/24 0129)   iopamidol (ISOVUE-370) solution 100 mL (100 mLs Intravenous $Given 8/5/24 0236)   alum & mag hydroxide-simethicone (MAALOX) suspension 15 mL (15 mLs Oral $Given 8/5/24 0345)       NEW PRESCRIPTIONS STARTED AT TODAY'S ER VISIT  New Prescriptions    CEFDINIR (OMNICEF) 300 MG CAPSULE    Take 1 capsule (300 mg) by mouth 2 times daily    POLYETHYLENE GLYCOL (MIRALAX) 17 GM/DOSE POWDER    Take 17 g (1 Capful) by mouth daily for 30 days          =================================================================    HPI    Patient information was obtained from: Patient       Vinh Cuellar is a 63 year old male with a pertinent history of small bowel obstruction, depression, polyps, diverticulitis who presents to this ED for evaluation of abdominal pain.  Came in by EMS from home.  Reports onset abdominal pain this afternoon became severe.  No nausea vomiting or diarrhea.  Normal bowel movements.  History of small bowel obstruction.  Patient states after Toradol and Zofran and fluids pains much better.     Denies chest pain. Denies dysuria    Patient does use Flomax but is not using it a few days.  Patient states he did stool today and he has not vomited.    REVIEW OF SYSTEMS   Review of Systems   Cardiovascular:  Negative for chest pain.   Gastrointestinal:  Positive for abdominal pain. Negative for diarrhea and vomiting.   Genitourinary:  Negative for dysuria.        PAST MEDICAL HISTORY:  Past Medical History:   Diagnosis Date    Allergic rhinitis, cause unspecified     Allergic rhinitis       PAST SURGICAL HISTORY:  Past Surgical History:   Procedure Laterality Date    ZZC APPENDECTOMY      ZZC RECONSTRUCT PROX HUMERAL IMPLANT      Arthroplasty, Shoulder           CURRENT MEDICATIONS:    cefdinir (OMNICEF) 300 MG capsule  polyethylene glycol (MIRALAX) 17 GM/Dose powder  amoxicillin (AMOXIL) 500 MG capsule  buPROPion (WELLBUTRIN XL) 300 MG 24 hr tablet  cholecalciferol (VITAMIN D3) 25 mcg (1000 units) capsule  clindamycin (CLEOCIN) 300 MG capsule  fluvoxaMINE (LUVOX) 100 MG tablet  lamoTRIgine (LAMICTAL) 100 MG tablet  lamoTRIgine (LAMICTAL) 25 MG tablet  MOTRIN 800 MG OR TABS  SODIUM FLUORIDE 5000 PPM 1.1 % PSTE dental paste  tamsulosin (FLOMAX) 0.4 MG capsule  triamcinolone (KENALOG) 0.1 % external cream        ALLERGIES:  Allergies   Allergen Reactions    Tolterodine Tartrate Er Hives    Tolterodine Hives and Rash       FAMILY HISTORY:  Family History   Problem Relation Age of Onset    EYE* Mother         glaucoma       SOCIAL HISTORY:   Social History     Socioeconomic  History    Marital status: Single   Tobacco Use    Smoking status: Never    Smokeless tobacco: Never   Vaping Use    Vaping status: Never Used   Substance and Sexual Activity    Alcohol use: No    Drug use: No     Social Determinants of Health     Financial Resource Strain: Low Risk  (2/16/2024)    Financial Resource Strain     Within the past 12 months, have you or your family members you live with been unable to get utilities (heat, electricity) when it was really needed?: No   Food Insecurity: Low Risk  (2/16/2024)    Food Insecurity     Within the past 12 months, did you worry that your food would run out before you got money to buy more?: No     Within the past 12 months, did the food you bought just not last and you didn t have money to get more?: No   Transportation Needs: Low Risk  (2/16/2024)    Transportation Needs     Within the past 12 months, has lack of transportation kept you from medical appointments, getting your medicines, non-medical meetings or appointments, work, or from getting things that you need?: No   Physical Activity: Sufficiently Active (2/16/2024)    Exercise Vital Sign     Days of Exercise per Week: 6 days     Minutes of Exercise per Session: 90 min   Stress: No Stress Concern Present (2/16/2024)    [employer] - Occupational Stress Questionnaire     Feeling of Stress : Not at all   Social Connections: Unknown (2/16/2024)    Social Connection and Isolation Panel [NHANES]     Frequency of Social Gatherings with Friends and Family: Once a week   Interpersonal Safety: Low Risk  (2/19/2024)    Interpersonal Safety     Do you feel physically and emotionally safe where you currently live?: Yes     Within the past 12 months, have you been hit, slapped, kicked or otherwise physically hurt by someone?: No     Within the past 12 months, have you been humiliated or emotionally abused in other ways by your partner or ex-partner?: No   Housing Stability: Low Risk   "(2/16/2024)    Housing Stability     Do you have housing? : Yes     Are you worried about losing your housing?: No       VITALS:  /82   Pulse 68   Temp 97.2  F (36.2  C) (Temporal)   Resp 21   Ht 1.778 m (5' 10\")   Wt 67.1 kg (148 lb)   SpO2 99%   BMI 21.24 kg/m      PHYSICAL EXAM      Vitals: /82   Pulse 68   Temp 97.2  F (36.2  C) (Temporal)   Resp 21   Ht 1.778 m (5' 10\")   Wt 67.1 kg (148 lb)   SpO2 99%   BMI 21.24 kg/m    General: Appears in no acute distress, awake, alert, interactive.  Eyes: Conjunctivae non-injected. Sclera anicteric.  HENT: Atraumatic.  Neck: Supple.  Respiratory/Chest: Respiration unlabored.  No abdominal tenderness.  Abdomen not distended  Heart: blowing murmur  Abdomen: non distended  Musculoskeletal: Normal extremities. No edema or erythema.  Skin: Normal color. No rash or diaphoresis.  Neurologic: Face symmetric, moves all extremities spontaneously. Speech clear.  Psychiatric: Oriented to person, place, and time. Affect appropriate.    LAB:  All pertinent labs reviewed and interpreted.  Results for orders placed or performed during the hospital encounter of 08/05/24   CT Abdomen Pelvis w Contrast    Impression    IMPRESSION:   1.  The stomach and multiple loops of small bowel are mildly distended with intraluminal fluid. No transition point to suggest bowel obstruction. Findings may be due to a nonspecific gastroenteritis.  2.  Large amount of stool in the colon.  3.  Urinary bladder is distended and has a mildly thickened wall which may be due to outlet obstruction secondary to the enlarged prostate gland. Clinical correlation suggested.  4.  Trace free pelvic fluid.   Extra Blue Top Tube   Result Value Ref Range    Hold Specimen JIC    Extra Green Top (Lithium Heparin) Tube   Result Value Ref Range    Hold Specimen JIC    Extra Purple Top Tube   Result Value Ref Range    Hold Specimen JIC    CBC (+ platelets, no diff)   Result Value Ref Range    WBC Count " 16.6 (H) 4.0 - 11.0 10e3/uL    RBC Count 5.13 4.40 - 5.90 10e6/uL    Hemoglobin 15.4 13.3 - 17.7 g/dL    Hematocrit 43.3 40.0 - 53.0 %    MCV 84 78 - 100 fL    MCH 30.0 26.5 - 33.0 pg    MCHC 35.6 31.5 - 36.5 g/dL    RDW 13.0 10.0 - 15.0 %    Platelet Count 292 150 - 450 10e3/uL   Basic metabolic panel   Result Value Ref Range    Sodium 138 135 - 145 mmol/L    Potassium 4.4 3.4 - 5.3 mmol/L    Chloride 98 98 - 107 mmol/L    Carbon Dioxide (CO2) 23 22 - 29 mmol/L    Anion Gap 17 (H) 7 - 15 mmol/L    Urea Nitrogen 23.8 (H) 8.0 - 23.0 mg/dL    Creatinine 1.18 (H) 0.67 - 1.17 mg/dL    GFR Estimate 70 >60 mL/min/1.73m2    Calcium 10.1 8.8 - 10.4 mg/dL    Glucose 183 (H) 70 - 99 mg/dL   Hepatic function panel   Result Value Ref Range    Protein Total 7.3 6.4 - 8.3 g/dL    Albumin 4.6 3.5 - 5.2 g/dL    Bilirubin Total 0.6 <=1.2 mg/dL    Alkaline Phosphatase 93 40 - 150 U/L    AST 31 0 - 45 U/L    ALT 24 0 - 70 U/L    Bilirubin Direct <0.20 0.00 - 0.30 mg/dL   Result Value Ref Range    Lipase 42 13 - 60 U/L   UA with Microscopic reflex to Culture    Specimen: Urine, Midstream   Result Value Ref Range    Color Urine Yellow Colorless, Straw, Light Yellow, Yellow    Appearance Urine Turbid (A) Clear    Glucose Urine Negative Negative mg/dL    Bilirubin Urine Negative Negative    Ketones Urine Trace (A) Negative mg/dL    Specific Gravity Urine 1.020 1.001 - 1.030    Blood Urine Negative Negative    pH Urine 8.0 (H) 5.0 - 7.0    Protein Albumin Urine 20 (A) Negative mg/dL    Urobilinogen Urine <2.0 <2.0 mg/dL    Nitrite Urine Negative Negative    Leukocyte Esterase Urine Negative Negative    Bacteria Urine Few (A) None Seen /HPF    Mucus Urine Present (A) None Seen /LPF    Amorphous Crystals Urine Few (A) None Seen /HPF    RBC Urine 2 <=2 /HPF    WBC Urine 3 <=5 /HPF   Troponin T, High Sensitivity (now)   Result Value Ref Range    Troponin T, High Sensitivity 14 <=22 ng/L   Result Value Ref Range    Troponin T, High Sensitivity  13 <=22 ng/L       RADIOLOGY:  Reviewed all pertinent imaging. Please see official radiology report.  CT Abdomen Pelvis w Contrast   Final Result   IMPRESSION:    1.  The stomach and multiple loops of small bowel are mildly distended with intraluminal fluid. No transition point to suggest bowel obstruction. Findings may be due to a nonspecific gastroenteritis.   2.  Large amount of stool in the colon.   3.  Urinary bladder is distended and has a mildly thickened wall which may be due to outlet obstruction secondary to the enlarged prostate gland. Clinical correlation suggested.   4.  Trace free pelvic fluid.          EKG:    Performed at: 3:07 AM, 8/5/2024    Impression: Sinus rhythm. RSR' or QR pattern in V1 suggests right ventricular conduction delay. Prolonged QT.     Rate: 67 BPM  Rhythm: Sinus rhythm  Axis: 64  HI Interval: 146 ms  QRS Interval: 104 ms  QTc Interval: 486 ms  ST Changes: None  Comparison: When compared with ECG of 08/13/2016, no significant change was found.     I have independently reviewed and interpreted the EKG(s) documented above.          I, Camille Mcdaniel, am serving as a scribe to document services personally performed by Joe Phillip MD based on my observation and the provider's statements to me. I, Joe Phillip MD, attest that Camille Mcdaniel is acting in a scribe capacity, has observed my performance of the services and has documented them in accordance with my direction.    Joe Phillip MD  [facility] EMERGENCY ROOM  [address] [id number] [phone]        Joe Phillip MD  08/05/24 1807

## 2024-08-06 ENCOUNTER — PATIENT OUTREACH (OUTPATIENT)
Dept: FAMILY MEDICINE | Facility: CLINIC | Age: 64
End: 2024-08-06
Payer: COMMERCIAL

## 2024-08-06 NOTE — TELEPHONE ENCOUNTER
Writer called patient and left message to return call to clinic.     If patient returns call please route to nurse queue to complete TCM hospital follow up questionnaire, medication reconciliation and assist with scheduling a hospital follow up visit..    WALTER Overton, RN  [facility]

## 2024-08-07 NOTE — TELEPHONE ENCOUNTER
Called patient to relay message from Dr. Piedra and gather more information.     Patient stated that he is passing some gas but it is minimal, he does not have abdominal pain but would describe it as discomfort and worse when trying to eat but none of this is as bad as it was on Monday.     Patient stated that again his last BM was on 8/4/24 in the morning and has had two doses of the 17 mg miralax on Tuesday and Wednesday and wants to know a time frame as to when he should worry about not having another BM.       Patient denied any other symptoms at this time.     Ej Schmidt RN  [facility]

## 2024-08-07 NOTE — TELEPHONE ENCOUNTER
There is no definitive time.  I suggest he continue with the MiraLAX.  He should monitor for any symptoms of concern if they arise he should call back immediately for advice or seek reevaluation if needed.  Symptoms of concern would include worsening abdominal pain, not passing any gas, increased abdominal bloating, not able to maintain hydration or development of fever.  If he has not had a bowel movement in another 2 days he should call back.  If he is concerned or has questions at any time about anything he should simply call back

## 2024-08-07 NOTE — TELEPHONE ENCOUNTER
Please confirm that patient is passing gas and does not have increasing abdominal pain, bloating or other similar concerns.  If he does not recommend taking an extra dose of MiraLAX today.  If he does have the symptoms it would be best to be evaluated today.

## 2024-08-07 NOTE — TELEPHONE ENCOUNTER
"Transitions of Care Outreach  Chief Complaint   Patient presents with    Hospital F/U       Most Recent Admission Date: 8/5/2024   Most Recent Admission Diagnosis:      Most Recent Discharge Date: 8/5/2024   Most Recent Discharge Diagnosis: Constipation, unspecified constipation type - K59.00  Urinary retention - R33.9  Bacteria in urine - R82.71     Transitions of Care Assessment    Discharge Assessment  How are you doing now that you are home?: Patient reports improvement in abdominal pain since being in the Emergency Department. Reports when he was in the Emergency Department, pain was a 10 on a scale of 0 to 10. Denies current \"pain\", reports it is more of a discomfort now. Patient has not had bowel movement since Sunday (8/4/24) prior to Emergency Department. Reports he does not have an appetite and has not been eating much. Patient taking Miralax as prescribed. Patient also taking 45 mL of Milk of Magnesia (8/6 and 8/7). Denies abdominal bloating. Patient reports he usually has a bowel movement every day that he runs, occasionally skipping a day.  How are your symptoms? (Red Flag symptoms escalate to triage hotline per guidelines): Improved  Do you know how to contact your clinic care team if you have future questions or changes to your health status? : Yes  Does the patient have their discharge instructions? : Yes  Does the patient have questions regarding their discharge instructions? : No  Were you started on any new medications or were there changes to any of your previous medications? : Yes  Does the patient have all of their medications?: Yes  Do you have questions regarding any of your medications? : No  Do you have all of your needed medical supplies or equipment (DME)?  (i.e. oxygen tank, CPAP, cane, etc.): Yes    Follow up Plan     Discharge Follow-Up  Discharge follow up appointment scheduled in alignment with recommended follow up timeframe or Transitions of Risk Category? (Low = within 30 days; " Moderate= within 14 days; High= within 7 days): No  Patient's follow up appointment not scheduled: Patient declined scheduling support. Education on the importance of transitions of care follow up. Provided scheduling phone number.    No future appointments.    Outpatient Plan as outlined on AVS reviewed with patient.    For any urgent concerns, please contact our 24 hour nurse triage line: [phone] ([phone])       Cally Mcclendon RN

## 2024-08-07 NOTE — TELEPHONE ENCOUNTER
Called patient to relay message from Dr. Piedra, patient verbalized understanding and has no further questions at this time.     Ej Schmidt RN  [facility]

## 2024-08-21 ENCOUNTER — TRANSFERRED RECORDS (OUTPATIENT)
Dept: HEALTH INFORMATION MANAGEMENT | Facility: CLINIC | Age: 64
End: 2024-08-21
Payer: COMMERCIAL

## 2024-11-20 ENCOUNTER — TRANSFERRED RECORDS (OUTPATIENT)
Dept: HEALTH INFORMATION MANAGEMENT | Facility: CLINIC | Age: 64
End: 2024-11-20
Payer: COMMERCIAL

## 2025-01-20 ENCOUNTER — PATIENT OUTREACH (OUTPATIENT)
Dept: CARE COORDINATION | Facility: CLINIC | Age: 65
End: 2025-01-20
Payer: COMMERCIAL

## 2025-08-21 ENCOUNTER — TRANSFERRED RECORDS (OUTPATIENT)
Dept: HEALTH INFORMATION MANAGEMENT | Facility: CLINIC | Age: 65
End: 2025-08-21
Payer: COMMERCIAL